# Patient Record
Sex: FEMALE | Race: WHITE | NOT HISPANIC OR LATINO | URBAN - METROPOLITAN AREA
[De-identification: names, ages, dates, MRNs, and addresses within clinical notes are randomized per-mention and may not be internally consistent; named-entity substitution may affect disease eponyms.]

---

## 2017-03-15 ENCOUNTER — OFFICE (AMBULATORY)
Dept: URBAN - METROPOLITAN AREA CLINIC 64 | Facility: CLINIC | Age: 57
End: 2017-03-15

## 2017-03-15 VITALS
WEIGHT: 170 LBS | HEART RATE: 62 BPM | SYSTOLIC BLOOD PRESSURE: 129 MMHG | DIASTOLIC BLOOD PRESSURE: 89 MMHG | HEIGHT: 65 IN

## 2017-03-15 DIAGNOSIS — R10.84 GENERALIZED ABDOMINAL PAIN: ICD-10-CM

## 2017-03-15 DIAGNOSIS — K59.1 FUNCTIONAL DIARRHEA: ICD-10-CM

## 2017-03-15 DIAGNOSIS — Z12.11 ENCOUNTER FOR SCREENING FOR MALIGNANT NEOPLASM OF COLON: ICD-10-CM

## 2017-03-15 PROCEDURE — 99203 OFFICE O/P NEW LOW 30 MIN: CPT

## 2017-03-15 RX ORDER — METRONIDAZOLE 250 MG/1
750 TABLET, FILM COATED ORAL
Qty: 30 | Refills: 0 | Status: COMPLETED
Start: 2017-03-15 | End: 2017-04-13

## 2017-04-13 ENCOUNTER — OFFICE (AMBULATORY)
Dept: URBAN - METROPOLITAN AREA CLINIC 64 | Facility: CLINIC | Age: 57
End: 2017-04-13

## 2017-04-13 ENCOUNTER — ON CAMPUS - OUTPATIENT (AMBULATORY)
Dept: URBAN - METROPOLITAN AREA HOSPITAL 2 | Facility: HOSPITAL | Age: 57
End: 2017-04-13
Payer: COMMERCIAL

## 2017-04-13 VITALS
HEART RATE: 55 BPM | HEIGHT: 65 IN | OXYGEN SATURATION: 95 % | OXYGEN SATURATION: 98 % | OXYGEN SATURATION: 97 % | RESPIRATION RATE: 18 BRPM | SYSTOLIC BLOOD PRESSURE: 110 MMHG | WEIGHT: 168 LBS | HEART RATE: 66 BPM | OXYGEN SATURATION: 99 % | HEART RATE: 74 BPM | SYSTOLIC BLOOD PRESSURE: 88 MMHG | HEART RATE: 60 BPM | OXYGEN SATURATION: 96 % | RESPIRATION RATE: 20 BRPM | DIASTOLIC BLOOD PRESSURE: 67 MMHG | HEART RATE: 59 BPM | DIASTOLIC BLOOD PRESSURE: 53 MMHG | SYSTOLIC BLOOD PRESSURE: 117 MMHG | SYSTOLIC BLOOD PRESSURE: 124 MMHG | DIASTOLIC BLOOD PRESSURE: 79 MMHG | SYSTOLIC BLOOD PRESSURE: 148 MMHG | SYSTOLIC BLOOD PRESSURE: 98 MMHG | DIASTOLIC BLOOD PRESSURE: 56 MMHG | SYSTOLIC BLOOD PRESSURE: 120 MMHG | DIASTOLIC BLOOD PRESSURE: 48 MMHG | DIASTOLIC BLOOD PRESSURE: 92 MMHG | TEMPERATURE: 97.1 F | HEART RATE: 61 BPM | DIASTOLIC BLOOD PRESSURE: 59 MMHG | RESPIRATION RATE: 16 BRPM | OXYGEN SATURATION: 100 %

## 2017-04-13 DIAGNOSIS — Z12.11 ENCOUNTER FOR SCREENING FOR MALIGNANT NEOPLASM OF COLON: ICD-10-CM

## 2017-04-13 DIAGNOSIS — K62.1 RECTAL POLYP: ICD-10-CM

## 2017-04-13 LAB
GI HISTOLOGY: A. UNSPECIFIED: (no result)
GI HISTOLOGY: PDF REPORT: (no result)

## 2017-04-13 PROCEDURE — 45385 COLONOSCOPY W/LESION REMOVAL: CPT | Mod: 33

## 2017-04-13 PROCEDURE — 88305 TISSUE EXAM BY PATHOLOGIST: CPT

## 2017-04-13 RX ADMIN — PROPOFOL: 10 INJECTION, EMULSION INTRAVENOUS at 10:00

## 2018-04-26 ENCOUNTER — TRANSCRIBE ORDERS (OUTPATIENT)
Dept: ADMINISTRATIVE | Facility: HOSPITAL | Age: 58
End: 2018-04-26

## 2018-04-26 DIAGNOSIS — R07.9 CHEST PAIN, UNSPECIFIED TYPE: Primary | ICD-10-CM

## 2021-06-11 ENCOUNTER — APPOINTMENT (OUTPATIENT)
Dept: GENERAL RADIOLOGY | Facility: HOSPITAL | Age: 61
End: 2021-06-11

## 2021-06-11 ENCOUNTER — HOSPITAL ENCOUNTER (EMERGENCY)
Facility: HOSPITAL | Age: 61
Discharge: HOME | End: 2021-06-11
Admitting: EMERGENCY MEDICINE

## 2021-06-11 VITALS
TEMPERATURE: 98.6 F | SYSTOLIC BLOOD PRESSURE: 129 MMHG | BODY MASS INDEX: 25.83 KG/M2 | WEIGHT: 155 LBS | OXYGEN SATURATION: 99 % | DIASTOLIC BLOOD PRESSURE: 68 MMHG | HEIGHT: 65 IN | HEART RATE: 83 BPM | RESPIRATION RATE: 16 BRPM

## 2021-06-11 DIAGNOSIS — R22.41 MASS OF RIGHT FOOT: ICD-10-CM

## 2021-06-11 DIAGNOSIS — M79.671 RIGHT FOOT PAIN: Primary | ICD-10-CM

## 2021-06-11 LAB
ALBUMIN SERPL-MCNC: 4.5 G/DL (ref 3.5–5.2)
ALBUMIN/GLOB SERPL: 1.7 G/DL
ALP SERPL-CCNC: 100 U/L (ref 39–117)
ALT SERPL W P-5'-P-CCNC: 9 U/L (ref 1–33)
ANION GAP SERPL CALCULATED.3IONS-SCNC: 13 MMOL/L (ref 5–15)
APTT PPP: 28.5 SECONDS (ref 24–31)
AST SERPL-CCNC: 13 U/L (ref 1–32)
BASOPHILS # BLD AUTO: 0.1 10*3/MM3 (ref 0–0.2)
BASOPHILS NFR BLD AUTO: 0.7 % (ref 0–1.5)
BILIRUB SERPL-MCNC: 0.4 MG/DL (ref 0–1.2)
BUN SERPL-MCNC: 10 MG/DL (ref 8–23)
BUN/CREAT SERPL: 9.1 (ref 7–25)
CALCIUM SPEC-SCNC: 10 MG/DL (ref 8.6–10.5)
CHLORIDE SERPL-SCNC: 98 MMOL/L (ref 98–107)
CO2 SERPL-SCNC: 27 MMOL/L (ref 22–29)
CREAT SERPL-MCNC: 1.1 MG/DL (ref 0.57–1)
CRP SERPL-MCNC: 4.6 MG/DL (ref 0–0.5)
DEPRECATED RDW RBC AUTO: 40.3 FL (ref 37–54)
EOSINOPHIL # BLD AUTO: 0 10*3/MM3 (ref 0–0.4)
EOSINOPHIL NFR BLD AUTO: 0.3 % (ref 0.3–6.2)
ERYTHROCYTE [DISTWIDTH] IN BLOOD BY AUTOMATED COUNT: 13.1 % (ref 12.3–15.4)
ERYTHROCYTE [SEDIMENTATION RATE] IN BLOOD: 16 MM/HR (ref 0–30)
GFR SERPL CREATININE-BSD FRML MDRD: 51 ML/MIN/1.73
GLOBULIN UR ELPH-MCNC: 2.6 GM/DL
GLUCOSE SERPL-MCNC: 105 MG/DL (ref 65–99)
HCT VFR BLD AUTO: 39.1 % (ref 34–46.6)
HGB BLD-MCNC: 13.3 G/DL (ref 12–15.9)
HOLD SPECIMEN: NORMAL
INR PPP: 0.95 (ref 0.93–1.1)
LYMPHOCYTES # BLD AUTO: 0.5 10*3/MM3 (ref 0.7–3.1)
LYMPHOCYTES NFR BLD AUTO: 6.4 % (ref 19.6–45.3)
MCH RBC QN AUTO: 29.6 PG (ref 26.6–33)
MCHC RBC AUTO-ENTMCNC: 33.9 G/DL (ref 31.5–35.7)
MCV RBC AUTO: 87.3 FL (ref 79–97)
MONOCYTES # BLD AUTO: 0.6 10*3/MM3 (ref 0.1–0.9)
MONOCYTES NFR BLD AUTO: 7.2 % (ref 5–12)
NEUTROPHILS NFR BLD AUTO: 6.7 10*3/MM3 (ref 1.7–7)
NEUTROPHILS NFR BLD AUTO: 85.4 % (ref 42.7–76)
NRBC BLD AUTO-RTO: 0 /100 WBC (ref 0–0.2)
PLATELET # BLD AUTO: 311 10*3/MM3 (ref 140–450)
PMV BLD AUTO: 9 FL (ref 6–12)
POTASSIUM SERPL-SCNC: 4 MMOL/L (ref 3.5–5.2)
PROT SERPL-MCNC: 7.1 G/DL (ref 6–8.5)
PROTHROMBIN TIME: 10.5 SECONDS (ref 9.6–11.7)
RBC # BLD AUTO: 4.48 10*6/MM3 (ref 3.77–5.28)
SODIUM SERPL-SCNC: 138 MMOL/L (ref 136–145)
WBC # BLD AUTO: 7.9 10*3/MM3 (ref 3.4–10.8)

## 2021-06-11 PROCEDURE — 85652 RBC SED RATE AUTOMATED: CPT | Performed by: NURSE PRACTITIONER

## 2021-06-11 PROCEDURE — 73630 X-RAY EXAM OF FOOT: CPT

## 2021-06-11 PROCEDURE — 87040 BLOOD CULTURE FOR BACTERIA: CPT | Performed by: NURSE PRACTITIONER

## 2021-06-11 PROCEDURE — 96375 TX/PRO/DX INJ NEW DRUG ADDON: CPT

## 2021-06-11 PROCEDURE — 25010000002 MORPHINE PER 10 MG: Performed by: NURSE PRACTITIONER

## 2021-06-11 PROCEDURE — 86140 C-REACTIVE PROTEIN: CPT | Performed by: NURSE PRACTITIONER

## 2021-06-11 PROCEDURE — 85025 COMPLETE CBC W/AUTO DIFF WBC: CPT | Performed by: NURSE PRACTITIONER

## 2021-06-11 PROCEDURE — 85610 PROTHROMBIN TIME: CPT | Performed by: NURSE PRACTITIONER

## 2021-06-11 PROCEDURE — 80053 COMPREHEN METABOLIC PANEL: CPT | Performed by: NURSE PRACTITIONER

## 2021-06-11 PROCEDURE — 25010000002 ONDANSETRON PER 1 MG: Performed by: NURSE PRACTITIONER

## 2021-06-11 PROCEDURE — 96374 THER/PROPH/DIAG INJ IV PUSH: CPT

## 2021-06-11 PROCEDURE — 85730 THROMBOPLASTIN TIME PARTIAL: CPT | Performed by: NURSE PRACTITIONER

## 2021-06-11 PROCEDURE — 99283 EMERGENCY DEPT VISIT LOW MDM: CPT

## 2021-06-11 PROCEDURE — 25010000002 CEFTRIAXONE PER 250 MG: Performed by: NURSE PRACTITIONER

## 2021-06-11 RX ORDER — HYDROXYZINE PAMOATE 50 MG/1
50 CAPSULE ORAL 3 TIMES DAILY PRN
Qty: 12 CAPSULE | Refills: 0 | Status: SHIPPED | OUTPATIENT
Start: 2021-06-11 | End: 2021-06-11

## 2021-06-11 RX ORDER — SODIUM CHLORIDE 0.9 % (FLUSH) 0.9 %
10 SYRINGE (ML) INJECTION AS NEEDED
Status: DISCONTINUED | OUTPATIENT
Start: 2021-06-11 | End: 2021-06-11 | Stop reason: HOSPADM

## 2021-06-11 RX ORDER — MORPHINE SULFATE 4 MG/ML
4 INJECTION, SOLUTION INTRAMUSCULAR; INTRAVENOUS ONCE
Status: COMPLETED | OUTPATIENT
Start: 2021-06-11 | End: 2021-06-11

## 2021-06-11 RX ORDER — HYDROXYZINE PAMOATE 50 MG/1
50 CAPSULE ORAL 3 TIMES DAILY PRN
Qty: 12 CAPSULE | Refills: 0 | Status: SHIPPED | OUTPATIENT
Start: 2021-06-11 | End: 2022-05-05

## 2021-06-11 RX ORDER — ONDANSETRON 4 MG/1
4 TABLET, ORALLY DISINTEGRATING ORAL EVERY 8 HOURS PRN
Qty: 8 TABLET | Refills: 0 | Status: SHIPPED | OUTPATIENT
Start: 2021-06-11 | End: 2022-05-05

## 2021-06-11 RX ORDER — ONDANSETRON 4 MG/1
4 TABLET, ORALLY DISINTEGRATING ORAL EVERY 8 HOURS PRN
Qty: 8 TABLET | Refills: 0 | Status: SHIPPED | OUTPATIENT
Start: 2021-06-11 | End: 2021-06-11

## 2021-06-11 RX ORDER — ONDANSETRON 2 MG/ML
4 INJECTION INTRAMUSCULAR; INTRAVENOUS ONCE
Status: COMPLETED | OUTPATIENT
Start: 2021-06-11 | End: 2021-06-11

## 2021-06-11 RX ADMIN — WATER 1 G: 100 INJECTION, SOLUTION INTRAVENOUS at 20:29

## 2021-06-11 RX ADMIN — ONDANSETRON 4 MG: 2 INJECTION INTRAMUSCULAR; INTRAVENOUS at 18:11

## 2021-06-11 RX ADMIN — MORPHINE SULFATE 4 MG: 4 INJECTION INTRAVENOUS at 18:11

## 2021-06-11 NOTE — ED NOTES
Pt stated she was diagnosed with stage 1 melanoma two years ago on her right foot. Pt stated 6 months later after a surgery, she was diagnosed with stage 4 melanoma. Pt stated two days ago she went to see Dr Crain with [facility] outpatient surgery to schedule a surgery on her right foot. Pt was given Sulfamethoxazole and has been taking the meds as prescribed.    Pt stated this morning with anxiety, chills, nausea and pain in her right foot. Pt denies any n/d.     David Gibson, RN  06/11/21 2419

## 2021-06-12 NOTE — ED PROVIDER NOTES
Subjective   Patient is a 60-year-old white female with history of anxiety presents today with complaints of pain in her right foot.  She states recently diagnosed with melanoma with a fungating mass of the right foot.  She states she has had worsening symptoms over the last couple of months.  She states she saw surgeon at [facility] earlier this week and his tentative plans to have the mass excised with partial amputation of the foot.  She states she was started on antibiotics 2 days ago and has been taking those as prescribed.  She presents to the ER today with continued pain and requesting referrals to podiatrist and surgeons here in this area as she has transportation issues and difficulty getting to [place].  She denies any fever but states she has had some chills.  She denies any vomiting but states she has had some nausea.          Review of Systems   Constitutional: Positive for chills. Negative for fever.   Gastrointestinal: Positive for nausea. Negative for vomiting.   Musculoskeletal:        Right foot pain, mass to right foot   Psychiatric/Behavioral: The patient is nervous/anxious.        No past medical history on file.    No Known Allergies    No past surgical history on file.    No family history on file.    Social History     Socioeconomic History   • Marital status:      Spouse name: Not on file   • Number of children: Not on file   • Years of education: Not on file   • Highest education level: Not on file           Objective   Physical Exam  Vital signs and triage nurse note reviewed.  Constitutional: Awake, alert; well-developed and well-nourished. No acute distress is noted.  Appears anxious.  HEENT: Normocephalic, atraumatic; pupils are PERRL with intact EOM; oropharynx is pink and moist without exudate or erythema.  No drooling or pooling of oral secretions.  Neck: Supple, full range of motion without pain; no cervical lymphadenopathy. Normal phonation.  Cardiovascular: Regular rate and  rhythm, normal S1-S2.  No murmur noted.  Pulmonary: Respiratory effort regular nonlabored, breath sounds clear to auscultation all fields.  Abdomen: Soft, nontender, nondistended with normoactive bowel sounds; no rebound or guarding.  Musculoskeletal: Independent range of motion of all extremities.  There is a fungating oozing mass to the right foot located between the second and third toes.  It appears ulcerated and necrotic.  There is no surrounding erythema or edema to the rest of the foot.  No streaking.  Neuro: Alert oriented x3, speech is clear and appropriate, GCS 15.    Skin: Flesh tone, warm, dry    Procedures           ED Course      Labs Reviewed   COMPREHENSIVE METABOLIC PANEL - Abnormal; Notable for the following components:       Result Value    Glucose 105 (*)     Creatinine 1.10 (*)     eGFR Non  Amer 51 (*)     All other components within normal limits    Narrative:     GFR Normal >60  Chronic Kidney Disease <60  Kidney Failure <15     C-REACTIVE PROTEIN - Abnormal; Notable for the following components:    C-Reactive Protein 4.60 (*)     All other components within normal limits   CBC WITH AUTO DIFFERENTIAL - Abnormal; Notable for the following components:    Neutrophil % 85.4 (*)     Lymphocyte % 6.4 (*)     Lymphocytes, Absolute 0.50 (*)     All other components within normal limits   PROTIME-INR - Normal   APTT - Normal   SEDIMENTATION RATE - Normal   BLOOD CULTURE   BLOOD CULTURE   CBC AND DIFFERENTIAL    Narrative:     The following orders were created for panel order CBC & Differential.  Procedure                               Abnormality         Status                     ---------                               -----------         ------                     CBC Auto Differential[[id number]]        Abnormal            Final result                 Please view results for these tests on the individual orders.   EXTRA TUBES    Narrative:     The following orders were created for panel order  Extra Tubes.  Procedure                               Abnormality         Status                     ---------                               -----------         ------                     [facility][[id number]]                                   Final result                 Please view results for these tests on the individual orders.   [facility]     XR Foot 3+ View Right    Result Date: 6/11/2021  No acute osseous abnormality. Masses likely related to known malignancy as above. No destructive changes identified within the osseous structures.  Electronically Signed By-Diane Hooker MD On:6/11/2021 5:46 PM This report was finalized on [id number] by  Diane Hooker MD.    Medications   sodium chloride 0.9 % flush 10 mL (has no administration in time range)   Morphine sulfate (PF) injection 4 mg (4 mg Intravenous Given 6/11/21 1811)   ondansetron (ZOFRAN) injection 4 mg (4 mg Intravenous Given 6/11/21 1811)   cefTRIAXone (ROCEPHIN) in SWFI 1 gram/10ml IV PUSH syringe (1 g Intravenous Given 6/11/21 2029)                                          MDM  Number of Diagnoses or Management Options  Mass of right foot  Right foot pain  Diagnosis management comments: Patient had IV established.  She had labs and x-ray obtained.  She was given morphine for pain and Zofran for nausea.    Work-up: CBC and metabolic panel are grossly unremarkable.  Sed rate is within normal limits.  CRP mildly elevated at 4.6.  Blood cultures were obtained.  X-ray of the right foot shows No acute osseous abnormality. Masses likely related to known malignancy as above. No destructive changes identified within the osseous structures.    On reexamination, the patient is resting comfortably.  She is in no distress.  She still appears mildly anxious but has stable vital signs.  She is given a dose of IV Rocephin.  She was discussed with Dr. Luna, on-call podiatrist, who agrees to see patient as an outpatient in the office next week.    Patient  will be discharged home and instructed to continue her antibiotics and follow-up with Dr. Luna next week.  She is given warning signs for prompt return.  She voiced understanding.    Diagnosis and treatment plan discussed with patient.  Patient agreeable to plan.   I discussed findings with patient who voices understanding of discharge instructions, signs and symptoms requiring return to ED; discharged improved and in stable condition with follow up for re-evaluation.  Prescription for Zofran and hydroxyzine.       Amount and/or Complexity of Data Reviewed  Clinical lab tests: reviewed and ordered  Tests in the radiology section of CPT®: reviewed and ordered    Patient Progress  Patient progress: stable      Final diagnoses:   Right foot pain   Mass of right foot       ED Disposition  ED Disposition     ED Disposition Condition Comment    Discharge Stable           CHARLOTTE Luna DPM  [address] [place] [address]  [phone]    Schedule an appointment as soon as possible for a visit       Maximo Medina MD  [address] [facility] [place] [address]  [phone]    Schedule an appointment as soon as possible for a visit       Ray Osorio MD  [address] [facility] [place] [facility]  [phone]    Schedule an appointment as soon as possible for a visit       [facility] - [place] [address]  [phone]  Schedule an appointment as soon as possible for a visit            Medication List      New Prescriptions    hydrOXYzine pamoate 50 MG capsule  Commonly known as: VISTARIL  Take 1 capsule by mouth 3 (Three) Times a Day As Needed for Anxiety.     ondansetron ODT 4 MG disintegrating tablet  Commonly known as: Zofran ODT  Place 1 tablet on the tongue Every 8 (Eight) Hours As Needed for Nausea.           Where to Get Your Medications      These medications were sent to [facility]/pharmacy #[id number] - [facility] [place] - [address] [facility] - [phone] [place] [phone]   [address] [facility]  [place] [address]    Hours: 24-hours Phone: [phone]   · hydrOXYzine pamoate 50 MG capsule  · ondansetron ODT 4 MG disintegrating tablet          Kacy Pritchard, MARIN  06/11/21 8737

## 2021-06-12 NOTE — DISCHARGE INSTRUCTIONS
Continue antibiotics as previously prescribed.  Take new medication as prescribed.  Follow-up with podiatry.  Call them Monday for an appointment time.  Return for new or worsening symptoms.

## 2021-06-16 LAB
BACTERIA SPEC AEROBE CULT: NORMAL
BACTERIA SPEC AEROBE CULT: NORMAL

## 2022-05-05 ENCOUNTER — OFFICE VISIT (OUTPATIENT)
Dept: PODIATRY | Facility: CLINIC | Age: 62
End: 2022-05-05

## 2022-05-05 VITALS
HEART RATE: 97 BPM | HEIGHT: 65 IN | DIASTOLIC BLOOD PRESSURE: 88 MMHG | WEIGHT: 155 LBS | BODY MASS INDEX: 25.83 KG/M2 | SYSTOLIC BLOOD PRESSURE: 151 MMHG

## 2022-05-05 DIAGNOSIS — Z89.421 RIGHT TOE AMPUTEE: ICD-10-CM

## 2022-05-05 DIAGNOSIS — D48.5 NEOPLASM OF UNCERTAIN BEHAVIOR OF SKIN OF FOOT: ICD-10-CM

## 2022-05-05 DIAGNOSIS — M79.671 ACUTE FOOT PAIN, RIGHT: Primary | ICD-10-CM

## 2022-05-05 PROCEDURE — 99204 OFFICE O/P NEW MOD 45 MIN: CPT | Performed by: PODIATRIST

## 2022-05-05 RX ORDER — AMITRIPTYLINE HYDROCHLORIDE 25 MG/1
25 TABLET, FILM COATED ORAL
COMMUNITY
Start: 2022-04-11

## 2022-05-05 NOTE — H&P (VIEW-ONLY)
05/05/2022  Foot and Ankle Surgery - New Patient   Provider: Dr. Daniel Luna DPM  Location: [facility]    Subjective:  Martha Ruiz is a 61 y.o. female.     Chief Complaint   Patient presents with   • Right Foot - Toe Pain   • Initial Evaluation     Last pcp appt with abran Wyatt md  10/1/2022       HPI::     The patient presents to clinic today for right foot injury.    The patient states that she had a transmetatarsal amputation on her right foot 10 months ago in 06/28/2021. She communicates that she followed up with her surgeon and everything healed up fine, but the weird thing is that the bone curled up and is coming out. She states that it started 5 weeks ago. She reports that 2 days ago the bone came out of the skin. She reports that her pain level is between 2 and 3 out of 10. She adds that she dropped a package of cheese on it.    She communicates that she got the transmetatarsal amputations due to melanoma. She adds that 2021 was when she had her last surgery. She reports that got a spot on her foot from what she feels was due to a scratch that she got while outside with her dogs. She states that she cleaned it. She states that she has been going through this for 3 years and it the spot never spread. She denies any other medical issues. Se denies having diabetes. She denies any other skin lesions.     The patient communicates that she is not allergic to anything, but when she had her surgery she thinks that the anesthesia must have been too strong because it affected her stomach for about a month. She states that Dr. Amin performed her surgery. She reports that she thinks that he is a surgical oncologist.    She communicates that she researched to locate me.     No Known Allergies    History reviewed. No pertinent past medical history.    Past Surgical History:   Procedure Laterality Date   • TOE AMPUTATION Right        Family History   Problem Relation Age of Onset   • Cancer Mother    •  "Heart disease Father    • No Known Problems Maternal Grandmother    • No Known Problems Maternal Grandfather    • No Known Problems Paternal Grandmother    • No Known Problems Paternal Grandfather        Social History     Socioeconomic History   • Marital status:    Tobacco Use   • Smoking status: Former Smoker   • Smokeless tobacco: Never Used   Vaping Use   • Vaping Use: Never used   Substance and Sexual Activity   • Alcohol use: Never   • Drug use: Never   • Sexual activity: Defer        Current Outpatient Medications on File Prior to Visit   Medication Sig Dispense Refill   • amitriptyline (ELAVIL) 25 MG tablet Take 25 mg by mouth every night at bedtime.       No current facility-administered medications on file prior to visit.       Review of Systems:  General: Denies fever, chills, fatigue, and weakness.  Eyes: Denies vision loss, blurry vision, and excessive redness.  ENT: Denies hearing issues and difficulty swallowing.  Cardiovascular: Denies palpitations, chest pain, or syncopal episodes.  Respiratory: Denies shortness of breath, wheezing, and coughing.  GI: Denies abdominal pain, nausea, and vomiting.   : Denies frequency, hematuria, and urgency.  Musculoskeletal: Denies muscle cramps, joint pains, and stiffness.  Derm: + Right foot wound  Neuro: Denies headaches, numbness, loss of coordination, and tremors.  Psych: Denies anxiety and depression.  Endocrine: Denies temperature intolerance and changes in appetite.  Heme: Denies bleeding disorders or abnormal bruising.     Objective   /88   Pulse 97   Ht 165.1 cm (65\")   Wt 70.3 kg (155 lb)   BMI 25.79 kg/m²     Foot/Ankle Exam:       General:   Appearance: appears stated age and healthy    Orientation: AAOx3    Affect: appropriate      VASCULAR      Right Foot Vascularity   Normal vascular exam    Dorsalis pedis:  2+  Posterior tibial:  2+  Skin Temperature: warm    Edema Grading:  None  CFT:  < 3 seconds  Pedal Hair Growth:  " Present  Varicosities: none        NEUROLOGIC     Right Foot Neurologic   Light touch sensation:  Normal  Hot/Cold sensation: normal    Achilles reflex:  2+     MUSCULOSKELETAL      Right Foot Musculoskeletal    Amputation   Toes amputated: second toe and third toe  Ecchymosis:  None  Tenderness: 2nd MTPJ and 3rd MTPJ       MUSCLE STRENGTH     Right Foot Muscle Strength   Normal strength    Foot dorsiflexion:  5  Foot plantar flexion:  5  Foot inversion:  5  Foot eversion:  5     DERMATOLOGIC     Right Foot Dermatologic   Skin: ulcer    Skin: right foot skin not intact, no right foot cellulitis, no right foot drainage, no right foot redness and no right foot maceration        Right Foot Additional Comments Raised nodule involving the amputation site of the second and third digits with central ulceration.  No periwound erythema, maceration, or drainage.  Underlying mass-effect.  Nodule measures approximately 1 cm in diameter.  Mild periwound purplish discoloration      Assessment/Plan   Diagnoses and all orders for this visit:    1. Acute foot pain, right (Primary)  -     XR Foot 3+ View Right    2. Neoplasm of uncertain behavior of skin of foot  -     MRI Foot Right With & Without Contrast; Future  -     COVID PRE-OP / PRE-PROCEDURE SCREENING ORDER (NO ISOLATION) - Swab, Nasopharynx; Future  -     CBC (No Diff); Future  -     Basic Metabolic Panel; Future  -     ECG 12 Lead; Future  -     XR Chest 2 View; Future  -     Case Request; Standing  -     Case Request    3. Right toe amputee (HCC)    Other orders  -     Follow Anesthesia Guidelines / Standing Orders; Future  -     Obtain Informed Consent; Future  -     Provide NPO Instructions to Patient; Future  -     Chlorhexidine Skin Prep; Future      Patient is a 61-year-old female that presents with wound involving her right foot.  Patient states that the wound has been present over the last week.  She states that she dropped a pack of cheese on her foot and feels  that this caused the problem.  She states that she had an underlying soft tissue prominence to this area for several weeks prior to this incident.  She does have amputation of the second and third toes from previous melanoma resection last year.  Apparently surgery was performed by Dr. Amin.  She has not had any recent evaluation by her surgeon or oncologist.  Patient feels that the nodule and ulceration are due to heterotopic bone formation from the amputation.  Imaging was independently reviewed showing no bony proliferation or further concerns.  She does have mild erosive changes involving the second and third metatarsal heads.  I have explained that findings are concerning for recurrent melanoma.  I have suggested that we proceed with an MRI with contrast.  I also feel that the most appropriate option would be to proceed with excisional biopsy of the lesion.  She does understand that she may require additional surgery including major amputation if results are positive for melanoma.  We will also need to consider referral back to Dr. Amin/surgical oncology.  We will plan for the MRI in the near future with surgery following.  Greater than 45 minutes was spent before, during, and after evaluation for patient care.    Orders Placed This Encounter   Procedures   • COVID PRE-OP / PRE-PROCEDURE SCREENING ORDER (NO ISOLATION) - Swab, Nasopharynx     Standing Status:   Future     Standing Expiration Date:   5/6/2023     Order Specific Question:   Please select your location:     Answer:    [place]     Order Specific Question:   COVID Screening Order:     Answer:   In-House: EMERGENT/PREOP-CEPHEID, 3-4 HR TAT [[id number]]     Order Specific Question:   Previously tested for COVID-19?     Answer:   Unknown     Order Specific Question:   Employed in healthcare setting?     Answer:   No     Order Specific Question:   Symptomatic for COVID-19 as defined by CDC?     Answer:   No     Order Specific Question:    Hospitalized for COVID-19?     Answer:   No     Order Specific Question:   Admitted to ICU for COVID-19?     Answer:   No     Order Specific Question:   Resident in a congregate (group) care setting?     Answer:   No     Order Specific Question:   Pregnant?     Answer:   No     Order Specific Question:   Release to patient     Answer:   Immediate   • XR Foot 3+ View Right     Order Specific Question:   Reason for Exam:     Answer:   s/p right toe amputation x 2 6/28/2021   . drop injurt 2 days ago. open wound, r/o osteomyelitis   room 10 wb     Order Specific Question:   Does this patient have a diabetic monitoring/medication delivering device on?     Answer:   No     Order Specific Question:   Release to patient     Answer:   Immediate   • MRI Foot Right With & Without Contrast     Standing Status:   Future     Standing Expiration Date:   5/5/2023     Scheduling Instructions:      right foot mri with and without contrast      Dx: neoplasm of skin   • XR Chest 2 View     Standing Status:   Future     Standing Expiration Date:   5/6/2023     Order Specific Question:   Reason for Exam:     Answer:   Preop   • CBC (No Diff)     Standing Status:   Future     Standing Expiration Date:   5/6/2023     Order Specific Question:   Release to patient     Answer:   Immediate   • Basic Metabolic Panel     Standing Status:   Future     Standing Expiration Date:   5/6/2023     Order Specific Question:   Release to patient     Answer:   Immediate   • Follow Anesthesia Guidelines / Standing Orders     Standing Status:   Future   • Obtain Informed Consent     Standing Status:   Future     Order Specific Question:   Informed Consent Given For     Answer:   Excisional biopsy of unknown soft tissue lesion involving the right foot with attempted closure   • Provide NPO Instructions to Patient     Standing Status:   Future   • Chlorhexidine Skin Prep     Chlorhexidine Skin Prep and Instructions For All Patients Having A Procedure  Requiring an Outward Incision if Not Allergic. If Allergic, Give Antibacterial Skin Wipes and Instructions. Do Not Use For Facial Cases or on Any Mucus Membranes.     Standing Status:   Future   • ECG 12 Lead     Standing Status:   Future     Standing Expiration Date:   5/6/2023     Order Specific Question:   Reason for Exam:     Answer:   Preop          Note is dictated utilizing voice recognition software. Unfortunately this leads to occasional typographical errors. I apologize in advance if the situation occurs. If questions occur please do not hesitate to call our office.

## 2022-05-05 NOTE — PROGRESS NOTES
05/05/2022  Foot and Ankle Surgery - New Patient   Provider: Dr. Daniel Luna DPM  Location: [facility]    Subjective:  Martha Ruiz is a 61 y.o. female.     Chief Complaint   Patient presents with   • Right Foot - Toe Pain   • Initial Evaluation     Last pcp appt with abran Wyatt md  10/1/2022       HPI::     The patient presents to clinic today for right foot injury.    The patient states that she had a transmetatarsal amputation on her right foot 10 months ago in 06/28/2021. She communicates that she followed up with her surgeon and everything healed up fine, but the weird thing is that the bone curled up and is coming out. She states that it started 5 weeks ago. She reports that 2 days ago the bone came out of the skin. She reports that her pain level is between 2 and 3 out of 10. She adds that she dropped a package of cheese on it.    She communicates that she got the transmetatarsal amputations due to melanoma. She adds that 2021 was when she had her last surgery. She reports that got a spot on her foot from what she feels was due to a scratch that she got while outside with her dogs. She states that she cleaned it. She states that she has been going through this for 3 years and it the spot never spread. She denies any other medical issues. Se denies having diabetes. She denies any other skin lesions.     The patient communicates that she is not allergic to anything, but when she had her surgery she thinks that the anesthesia must have been too strong because it affected her stomach for about a month. She states that Dr. Amin performed her surgery. She reports that she thinks that he is a surgical oncologist.    She communicates that she researched to locate me.     No Known Allergies    History reviewed. No pertinent past medical history.    Past Surgical History:   Procedure Laterality Date   • TOE AMPUTATION Right        Family History   Problem Relation Age of Onset   • Cancer Mother    •  "Heart disease Father    • No Known Problems Maternal Grandmother    • No Known Problems Maternal Grandfather    • No Known Problems Paternal Grandmother    • No Known Problems Paternal Grandfather        Social History     Socioeconomic History   • Marital status:    Tobacco Use   • Smoking status: Former Smoker   • Smokeless tobacco: Never Used   Vaping Use   • Vaping Use: Never used   Substance and Sexual Activity   • Alcohol use: Never   • Drug use: Never   • Sexual activity: Defer        Current Outpatient Medications on File Prior to Visit   Medication Sig Dispense Refill   • amitriptyline (ELAVIL) 25 MG tablet Take 25 mg by mouth every night at bedtime.       No current facility-administered medications on file prior to visit.       Review of Systems:  General: Denies fever, chills, fatigue, and weakness.  Eyes: Denies vision loss, blurry vision, and excessive redness.  ENT: Denies hearing issues and difficulty swallowing.  Cardiovascular: Denies palpitations, chest pain, or syncopal episodes.  Respiratory: Denies shortness of breath, wheezing, and coughing.  GI: Denies abdominal pain, nausea, and vomiting.   : Denies frequency, hematuria, and urgency.  Musculoskeletal: Denies muscle cramps, joint pains, and stiffness.  Derm: + Right foot wound  Neuro: Denies headaches, numbness, loss of coordination, and tremors.  Psych: Denies anxiety and depression.  Endocrine: Denies temperature intolerance and changes in appetite.  Heme: Denies bleeding disorders or abnormal bruising.     Objective   /88   Pulse 97   Ht 165.1 cm (65\")   Wt 70.3 kg (155 lb)   BMI 25.79 kg/m²     Foot/Ankle Exam:       General:   Appearance: appears stated age and healthy    Orientation: AAOx3    Affect: appropriate      VASCULAR      Right Foot Vascularity   Normal vascular exam    Dorsalis pedis:  2+  Posterior tibial:  2+  Skin Temperature: warm    Edema Grading:  None  CFT:  < 3 seconds  Pedal Hair Growth:  " Present  Varicosities: none        NEUROLOGIC     Right Foot Neurologic   Light touch sensation:  Normal  Hot/Cold sensation: normal    Achilles reflex:  2+     MUSCULOSKELETAL      Right Foot Musculoskeletal    Amputation   Toes amputated: second toe and third toe  Ecchymosis:  None  Tenderness: 2nd MTPJ and 3rd MTPJ       MUSCLE STRENGTH     Right Foot Muscle Strength   Normal strength    Foot dorsiflexion:  5  Foot plantar flexion:  5  Foot inversion:  5  Foot eversion:  5     DERMATOLOGIC     Right Foot Dermatologic   Skin: ulcer    Skin: right foot skin not intact, no right foot cellulitis, no right foot drainage, no right foot redness and no right foot maceration        Right Foot Additional Comments Raised nodule involving the amputation site of the second and third digits with central ulceration.  No periwound erythema, maceration, or drainage.  Underlying mass-effect.  Nodule measures approximately 1 cm in diameter.  Mild periwound purplish discoloration      Assessment/Plan   Diagnoses and all orders for this visit:    1. Acute foot pain, right (Primary)  -     XR Foot 3+ View Right    2. Neoplasm of uncertain behavior of skin of foot  -     MRI Foot Right With & Without Contrast; Future  -     COVID PRE-OP / PRE-PROCEDURE SCREENING ORDER (NO ISOLATION) - Swab, Nasopharynx; Future  -     CBC (No Diff); Future  -     Basic Metabolic Panel; Future  -     ECG 12 Lead; Future  -     XR Chest 2 View; Future  -     Case Request; Standing  -     Case Request    3. Right toe amputee (HCC)    Other orders  -     Follow Anesthesia Guidelines / Standing Orders; Future  -     Obtain Informed Consent; Future  -     Provide NPO Instructions to Patient; Future  -     Chlorhexidine Skin Prep; Future      Patient is a 61-year-old female that presents with wound involving her right foot.  Patient states that the wound has been present over the last week.  She states that she dropped a pack of cheese on her foot and feels  that this caused the problem.  She states that she had an underlying soft tissue prominence to this area for several weeks prior to this incident.  She does have amputation of the second and third toes from previous melanoma resection last year.  Apparently surgery was performed by Dr. Amin.  She has not had any recent evaluation by her surgeon or oncologist.  Patient feels that the nodule and ulceration are due to heterotopic bone formation from the amputation.  Imaging was independently reviewed showing no bony proliferation or further concerns.  She does have mild erosive changes involving the second and third metatarsal heads.  I have explained that findings are concerning for recurrent melanoma.  I have suggested that we proceed with an MRI with contrast.  I also feel that the most appropriate option would be to proceed with excisional biopsy of the lesion.  She does understand that she may require additional surgery including major amputation if results are positive for melanoma.  We will also need to consider referral back to Dr. Amin/surgical oncology.  We will plan for the MRI in the near future with surgery following.  Greater than 45 minutes was spent before, during, and after evaluation for patient care.    Orders Placed This Encounter   Procedures   • COVID PRE-OP / PRE-PROCEDURE SCREENING ORDER (NO ISOLATION) - Swab, Nasopharynx     Standing Status:   Future     Standing Expiration Date:   5/6/2023     Order Specific Question:   Please select your location:     Answer:    [place]     Order Specific Question:   COVID Screening Order:     Answer:   In-House: EMERGENT/PREOP-CEPHEID, 3-4 HR TAT [[id number]]     Order Specific Question:   Previously tested for COVID-19?     Answer:   Unknown     Order Specific Question:   Employed in healthcare setting?     Answer:   No     Order Specific Question:   Symptomatic for COVID-19 as defined by CDC?     Answer:   No     Order Specific Question:    Hospitalized for COVID-19?     Answer:   No     Order Specific Question:   Admitted to ICU for COVID-19?     Answer:   No     Order Specific Question:   Resident in a congregate (group) care setting?     Answer:   No     Order Specific Question:   Pregnant?     Answer:   No     Order Specific Question:   Release to patient     Answer:   Immediate   • XR Foot 3+ View Right     Order Specific Question:   Reason for Exam:     Answer:   s/p right toe amputation x 2 6/28/2021   . drop injurt 2 days ago. open wound, r/o osteomyelitis   room 10 wb     Order Specific Question:   Does this patient have a diabetic monitoring/medication delivering device on?     Answer:   No     Order Specific Question:   Release to patient     Answer:   Immediate   • MRI Foot Right With & Without Contrast     Standing Status:   Future     Standing Expiration Date:   5/5/2023     Scheduling Instructions:      right foot mri with and without contrast      Dx: neoplasm of skin   • XR Chest 2 View     Standing Status:   Future     Standing Expiration Date:   5/6/2023     Order Specific Question:   Reason for Exam:     Answer:   Preop   • CBC (No Diff)     Standing Status:   Future     Standing Expiration Date:   5/6/2023     Order Specific Question:   Release to patient     Answer:   Immediate   • Basic Metabolic Panel     Standing Status:   Future     Standing Expiration Date:   5/6/2023     Order Specific Question:   Release to patient     Answer:   Immediate   • Follow Anesthesia Guidelines / Standing Orders     Standing Status:   Future   • Obtain Informed Consent     Standing Status:   Future     Order Specific Question:   Informed Consent Given For     Answer:   Excisional biopsy of unknown soft tissue lesion involving the right foot with attempted closure   • Provide NPO Instructions to Patient     Standing Status:   Future   • Chlorhexidine Skin Prep     Chlorhexidine Skin Prep and Instructions For All Patients Having A Procedure  Requiring an Outward Incision if Not Allergic. If Allergic, Give Antibacterial Skin Wipes and Instructions. Do Not Use For Facial Cases or on Any Mucus Membranes.     Standing Status:   Future   • ECG 12 Lead     Standing Status:   Future     Standing Expiration Date:   5/6/2023     Order Specific Question:   Reason for Exam:     Answer:   Preop          Note is dictated utilizing voice recognition software. Unfortunately this leads to occasional typographical errors. I apologize in advance if the situation occurs. If questions occur please do not hesitate to call our office.

## 2022-05-06 PROBLEM — D48.5 NEOPLASM OF UNCERTAIN BEHAVIOR OF SKIN OF FOOT: Status: ACTIVE | Noted: 2022-05-06

## 2022-05-06 RX ORDER — VITAMIN B COMPLEX
1 CAPSULE ORAL DAILY
COMMUNITY

## 2022-05-06 RX ORDER — MELATONIN
DAILY
COMMUNITY

## 2022-05-09 ENCOUNTER — HOSPITAL ENCOUNTER (OUTPATIENT)
Dept: CARDIOLOGY | Facility: HOSPITAL | Age: 62
Discharge: HOME | End: 2022-05-09

## 2022-05-09 ENCOUNTER — LAB (OUTPATIENT)
Dept: LAB | Facility: HOSPITAL | Age: 62
End: 2022-05-09

## 2022-05-09 ENCOUNTER — HOSPITAL ENCOUNTER (OUTPATIENT)
Dept: GENERAL RADIOLOGY | Facility: HOSPITAL | Age: 62
Discharge: HOME | End: 2022-05-09

## 2022-05-09 ENCOUNTER — HOSPITAL ENCOUNTER (OUTPATIENT)
Dept: MRI IMAGING | Facility: HOSPITAL | Age: 62
Discharge: HOME | End: 2022-05-09

## 2022-05-09 DIAGNOSIS — D48.5 NEOPLASM OF UNCERTAIN BEHAVIOR OF SKIN OF FOOT: ICD-10-CM

## 2022-05-09 LAB
ANION GAP SERPL CALCULATED.3IONS-SCNC: 10.7 MMOL/L (ref 5–15)
BUN SERPL-MCNC: 10 MG/DL (ref 8–23)
BUN/CREAT SERPL: 11 (ref 7–25)
CALCIUM SPEC-SCNC: 10.3 MG/DL (ref 8.6–10.5)
CHLORIDE SERPL-SCNC: 103 MMOL/L (ref 98–107)
CO2 SERPL-SCNC: 26.3 MMOL/L (ref 22–29)
CREAT BLDA-MCNC: 0.9 MG/DL (ref 0.6–1.3)
CREAT SERPL-MCNC: 0.91 MG/DL (ref 0.57–1)
DEPRECATED RDW RBC AUTO: 40.4 FL (ref 37–54)
EGFRCR SERPLBLD CKD-EPI 2021: 71.9 ML/MIN/1.73
EGFRCR SERPLBLD CKD-EPI 2021: 72.9 ML/MIN/1.73
ERYTHROCYTE [DISTWIDTH] IN BLOOD BY AUTOMATED COUNT: 12.5 % (ref 12.3–15.4)
GLUCOSE SERPL-MCNC: 91 MG/DL (ref 65–99)
HCT VFR BLD AUTO: 39.6 % (ref 34–46.6)
HGB BLD-MCNC: 13.5 G/DL (ref 12–15.9)
MCH RBC QN AUTO: 30.1 PG (ref 26.6–33)
MCHC RBC AUTO-ENTMCNC: 34.1 G/DL (ref 31.5–35.7)
MCV RBC AUTO: 88.2 FL (ref 79–97)
PLATELET # BLD AUTO: 291 10*3/MM3 (ref 140–450)
PMV BLD AUTO: 11.1 FL (ref 6–12)
POTASSIUM SERPL-SCNC: 3.9 MMOL/L (ref 3.5–5.2)
RBC # BLD AUTO: 4.49 10*6/MM3 (ref 3.77–5.28)
SODIUM SERPL-SCNC: 140 MMOL/L (ref 136–145)
WBC NRBC COR # BLD: 7.76 10*3/MM3 (ref 3.4–10.8)

## 2022-05-09 PROCEDURE — 73720 MRI LWR EXTREMITY W/O&W/DYE: CPT

## 2022-05-09 PROCEDURE — 93010 ELECTROCARDIOGRAM REPORT: CPT | Performed by: INTERNAL MEDICINE

## 2022-05-09 PROCEDURE — 93005 ELECTROCARDIOGRAM TRACING: CPT | Performed by: PODIATRIST

## 2022-05-09 PROCEDURE — 71046 X-RAY EXAM CHEST 2 VIEWS: CPT

## 2022-05-09 PROCEDURE — 80048 BASIC METABOLIC PNL TOTAL CA: CPT

## 2022-05-09 PROCEDURE — 25010000002 GADOTERIDOL PER 1 ML: Performed by: PODIATRIST

## 2022-05-09 PROCEDURE — 85027 COMPLETE CBC AUTOMATED: CPT

## 2022-05-09 PROCEDURE — 82565 ASSAY OF CREATININE: CPT

## 2022-05-09 PROCEDURE — A9579 GAD-BASE MR CONTRAST NOS,1ML: HCPCS | Performed by: PODIATRIST

## 2022-05-09 PROCEDURE — 36415 COLL VENOUS BLD VENIPUNCTURE: CPT

## 2022-05-09 RX ADMIN — GADOTERIDOL 13 ML: 279.3 INJECTION, SOLUTION INTRAVENOUS at 15:53

## 2022-05-10 ENCOUNTER — LAB (OUTPATIENT)
Dept: LAB | Facility: HOSPITAL | Age: 62
End: 2022-05-10

## 2022-05-10 DIAGNOSIS — Z01.818 PRE-OP TESTING: Primary | ICD-10-CM

## 2022-05-10 LAB
QT INTERVAL: 363 MS
SARS-COV-2 ORF1AB RESP QL NAA+PROBE: NOT DETECTED

## 2022-05-10 PROCEDURE — U0004 COV-19 TEST NON-CDC HGH THRU: HCPCS

## 2022-05-10 PROCEDURE — C9803 HOPD COVID-19 SPEC COLLECT: HCPCS

## 2022-05-11 ENCOUNTER — APPOINTMENT (OUTPATIENT)
Dept: LAB | Facility: HOSPITAL | Age: 62
End: 2022-05-11

## 2022-05-12 ENCOUNTER — ANESTHESIA EVENT (OUTPATIENT)
Dept: PERIOP | Facility: HOSPITAL | Age: 62
End: 2022-05-12

## 2022-05-13 ENCOUNTER — TELEPHONE (OUTPATIENT)
Dept: ORTHOPEDIC SURGERY | Facility: CLINIC | Age: 62
End: 2022-05-13

## 2022-05-13 ENCOUNTER — ANESTHESIA (OUTPATIENT)
Dept: PERIOP | Facility: HOSPITAL | Age: 62
End: 2022-05-13

## 2022-05-13 ENCOUNTER — HOSPITAL ENCOUNTER (OUTPATIENT)
Facility: HOSPITAL | Age: 62
Setting detail: HOSPITAL OUTPATIENT SURGERY
Discharge: HOME | End: 2022-05-13
Attending: PODIATRIST | Admitting: PODIATRIST

## 2022-05-13 VITALS
RESPIRATION RATE: 18 BRPM | TEMPERATURE: 96.9 F | DIASTOLIC BLOOD PRESSURE: 91 MMHG | BODY MASS INDEX: 25.72 KG/M2 | HEIGHT: 65 IN | WEIGHT: 154.4 LBS | OXYGEN SATURATION: 96 % | HEART RATE: 86 BPM | SYSTOLIC BLOOD PRESSURE: 168 MMHG

## 2022-05-13 DIAGNOSIS — D48.5 NEOPLASM OF UNCERTAIN BEHAVIOR OF SKIN OF FOOT: ICD-10-CM

## 2022-05-13 PROCEDURE — 25010000002 HYDRALAZINE PER 20 MG: Performed by: NURSE ANESTHETIST, CERTIFIED REGISTERED

## 2022-05-13 PROCEDURE — 25010000002 FENTANYL CITRATE (PF) 100 MCG/2ML SOLUTION: Performed by: NURSE ANESTHETIST, CERTIFIED REGISTERED

## 2022-05-13 PROCEDURE — 88305 TISSUE EXAM BY PATHOLOGIST: CPT | Performed by: PODIATRIST

## 2022-05-13 PROCEDURE — 25010000002 CEFAZOLIN PER 500 MG: Performed by: PODIATRIST

## 2022-05-13 PROCEDURE — 25010000002 DEXAMETHASONE PER 1 MG: Performed by: NURSE ANESTHETIST, CERTIFIED REGISTERED

## 2022-05-13 PROCEDURE — 25010000002 ONDANSETRON PER 1 MG: Performed by: NURSE ANESTHETIST, CERTIFIED REGISTERED

## 2022-05-13 PROCEDURE — 88341 IMHCHEM/IMCYTCHM EA ADD ANTB: CPT | Performed by: PODIATRIST

## 2022-05-13 PROCEDURE — 11104 PUNCH BX SKIN SINGLE LESION: CPT | Performed by: PODIATRIST

## 2022-05-13 PROCEDURE — 88307 TISSUE EXAM BY PATHOLOGIST: CPT | Performed by: PODIATRIST

## 2022-05-13 PROCEDURE — 88342 IMHCHEM/IMCYTCHM 1ST ANTB: CPT | Performed by: PODIATRIST

## 2022-05-13 PROCEDURE — 25010000002 PROPOFOL 10 MG/ML EMULSION: Performed by: NURSE ANESTHETIST, CERTIFIED REGISTERED

## 2022-05-13 PROCEDURE — 88311 DECALCIFY TISSUE: CPT | Performed by: PODIATRIST

## 2022-05-13 PROCEDURE — 25010000002 FENTANYL CITRATE (PF) 50 MCG/ML SOLUTION: Performed by: NURSE ANESTHETIST, CERTIFIED REGISTERED

## 2022-05-13 PROCEDURE — 28122 PARTIAL REMOVAL OF FOOT BONE: CPT | Performed by: PODIATRIST

## 2022-05-13 RX ORDER — GLYCOPYRROLATE 0.2 MG/ML
INJECTION INTRAMUSCULAR; INTRAVENOUS AS NEEDED
Status: DISCONTINUED | OUTPATIENT
Start: 2022-05-13 | End: 2022-05-13 | Stop reason: SURG

## 2022-05-13 RX ORDER — PROMETHAZINE HYDROCHLORIDE 25 MG/1
25 TABLET ORAL ONCE AS NEEDED
Status: DISCONTINUED | OUTPATIENT
Start: 2022-05-13 | End: 2022-05-13 | Stop reason: HOSPADM

## 2022-05-13 RX ORDER — MIDAZOLAM HYDROCHLORIDE 1 MG/ML
1 INJECTION INTRAMUSCULAR; INTRAVENOUS
Status: DISCONTINUED | OUTPATIENT
Start: 2022-05-13 | End: 2022-05-13 | Stop reason: HOSPADM

## 2022-05-13 RX ORDER — DIPHENHYDRAMINE HYDROCHLORIDE 50 MG/ML
12.5 INJECTION INTRAMUSCULAR; INTRAVENOUS
Status: DISCONTINUED | OUTPATIENT
Start: 2022-05-13 | End: 2022-05-13 | Stop reason: HOSPADM

## 2022-05-13 RX ORDER — FENTANYL CITRATE 50 UG/ML
50 INJECTION, SOLUTION INTRAMUSCULAR; INTRAVENOUS
Status: DISCONTINUED | OUTPATIENT
Start: 2022-05-13 | End: 2022-05-13 | Stop reason: HOSPADM

## 2022-05-13 RX ORDER — LIDOCAINE HYDROCHLORIDE 10 MG/ML
INJECTION, SOLUTION EPIDURAL; INFILTRATION; INTRACAUDAL; PERINEURAL AS NEEDED
Status: DISCONTINUED | OUTPATIENT
Start: 2022-05-13 | End: 2022-05-13 | Stop reason: SURG

## 2022-05-13 RX ORDER — HYDROCODONE BITARTRATE AND ACETAMINOPHEN 7.5; 325 MG/1; MG/1
1 TABLET ORAL ONCE AS NEEDED
Status: COMPLETED | OUTPATIENT
Start: 2022-05-13 | End: 2022-05-13

## 2022-05-13 RX ORDER — HYDRALAZINE HYDROCHLORIDE 20 MG/ML
5 INJECTION INTRAMUSCULAR; INTRAVENOUS
Status: DISCONTINUED | OUTPATIENT
Start: 2022-05-13 | End: 2022-05-13 | Stop reason: HOSPADM

## 2022-05-13 RX ORDER — ONDANSETRON 2 MG/ML
4 INJECTION INTRAMUSCULAR; INTRAVENOUS ONCE AS NEEDED
Status: DISCONTINUED | OUTPATIENT
Start: 2022-05-13 | End: 2022-05-13 | Stop reason: HOSPADM

## 2022-05-13 RX ORDER — DEXAMETHASONE SODIUM PHOSPHATE 4 MG/ML
INJECTION, SOLUTION INTRA-ARTICULAR; INTRALESIONAL; INTRAMUSCULAR; INTRAVENOUS; SOFT TISSUE AS NEEDED
Status: DISCONTINUED | OUTPATIENT
Start: 2022-05-13 | End: 2022-05-13 | Stop reason: SURG

## 2022-05-13 RX ORDER — ONDANSETRON 2 MG/ML
INJECTION INTRAMUSCULAR; INTRAVENOUS AS NEEDED
Status: DISCONTINUED | OUTPATIENT
Start: 2022-05-13 | End: 2022-05-13 | Stop reason: SURG

## 2022-05-13 RX ORDER — HYDROCODONE BITARTRATE AND ACETAMINOPHEN 7.5; 325 MG/1; MG/1
1 TABLET ORAL EVERY 6 HOURS PRN
Qty: 28 TABLET | Refills: 0 | Status: SHIPPED | OUTPATIENT
Start: 2022-05-13

## 2022-05-13 RX ORDER — FENTANYL CITRATE 50 UG/ML
INJECTION, SOLUTION INTRAMUSCULAR; INTRAVENOUS AS NEEDED
Status: DISCONTINUED | OUTPATIENT
Start: 2022-05-13 | End: 2022-05-13 | Stop reason: SURG

## 2022-05-13 RX ORDER — SODIUM CHLORIDE, SODIUM LACTATE, POTASSIUM CHLORIDE, CALCIUM CHLORIDE 600; 310; 30; 20 MG/100ML; MG/100ML; MG/100ML; MG/100ML
INJECTION, SOLUTION INTRAVENOUS CONTINUOUS PRN
Status: DISCONTINUED | OUTPATIENT
Start: 2022-05-13 | End: 2022-05-13 | Stop reason: SURG

## 2022-05-13 RX ORDER — PROMETHAZINE HYDROCHLORIDE 25 MG/1
25 SUPPOSITORY RECTAL ONCE AS NEEDED
Status: DISCONTINUED | OUTPATIENT
Start: 2022-05-13 | End: 2022-05-13 | Stop reason: HOSPADM

## 2022-05-13 RX ORDER — PROPOFOL 10 MG/ML
VIAL (ML) INTRAVENOUS AS NEEDED
Status: DISCONTINUED | OUTPATIENT
Start: 2022-05-13 | End: 2022-05-13 | Stop reason: SURG

## 2022-05-13 RX ORDER — FENTANYL CITRATE 50 UG/ML
100 INJECTION, SOLUTION INTRAMUSCULAR; INTRAVENOUS
Status: DISCONTINUED | OUTPATIENT
Start: 2022-05-13 | End: 2022-05-13 | Stop reason: HOSPADM

## 2022-05-13 RX ADMIN — FENTANYL CITRATE 50 MCG: 50 INJECTION, SOLUTION INTRAMUSCULAR; INTRAVENOUS at 10:19

## 2022-05-13 RX ADMIN — DEXAMETHASONE SODIUM PHOSPHATE 4 MG: 4 INJECTION, SOLUTION INTRAMUSCULAR; INTRAVENOUS at 08:54

## 2022-05-13 RX ADMIN — FENTANYL CITRATE 50 MCG: 50 INJECTION, SOLUTION INTRAMUSCULAR; INTRAVENOUS at 08:54

## 2022-05-13 RX ADMIN — GLYCOPYRROLATE 0.4 MCG: 0.2 INJECTION INTRAMUSCULAR; INTRAVENOUS at 09:27

## 2022-05-13 RX ADMIN — SODIUM CHLORIDE, SODIUM LACTATE, POTASSIUM CHLORIDE, AND CALCIUM CHLORIDE: .6; .31; .03; .02 INJECTION, SOLUTION INTRAVENOUS at 08:51

## 2022-05-13 RX ADMIN — HYDROCODONE BITARTRATE AND ACETAMINOPHEN 1 TABLET: 7.5; 325 TABLET ORAL at 10:19

## 2022-05-13 RX ADMIN — FENTANYL CITRATE 50 MCG: 50 INJECTION, SOLUTION INTRAMUSCULAR; INTRAVENOUS at 09:49

## 2022-05-13 RX ADMIN — ONDANSETRON 4 MG: 2 INJECTION INTRAMUSCULAR; INTRAVENOUS at 08:54

## 2022-05-13 RX ADMIN — FENTANYL CITRATE 25 MCG: 50 INJECTION, SOLUTION INTRAMUSCULAR; INTRAVENOUS at 09:17

## 2022-05-13 RX ADMIN — CEFAZOLIN 2 G: 2 INJECTION, POWDER, FOR SOLUTION INTRAMUSCULAR; INTRAVENOUS at 09:01

## 2022-05-13 RX ADMIN — HYDRALAZINE HYDROCHLORIDE 5 MG: 20 INJECTION INTRAMUSCULAR; INTRAVENOUS at 10:15

## 2022-05-13 RX ADMIN — FENTANYL CITRATE 25 MCG: 50 INJECTION, SOLUTION INTRAMUSCULAR; INTRAVENOUS at 09:21

## 2022-05-13 RX ADMIN — PROPOFOL 150 MG: 10 INJECTION, EMULSION INTRAVENOUS at 08:54

## 2022-05-13 RX ADMIN — LIDOCAINE HYDROCHLORIDE 30 MG: 10 INJECTION, SOLUTION EPIDURAL; INFILTRATION; INTRACAUDAL at 08:54

## 2022-05-13 NOTE — TELEPHONE ENCOUNTER
Caller: LILIAN   Relationship to Patient: SELF     Phone Number: [phone]  Reason for Call: PATIENT CALLING AFTER SX THIS MORNING 05/13/22, STATING DR SANTANA TOLD HER TO FOLLOW UP IN 1 WEEK, PATIENT CURRENTLY SCHEDULED FOR 2 WEEK POST OP, ATTEMPTED TO WARM TRANSFER

## 2022-05-13 NOTE — ANESTHESIA POSTPROCEDURE EVALUATION
Patient: Martha Ruiz    Procedure Summary     Date: 05/13/22 Room / Location: [facility] OR 07 / [facility] MAIN OR    Anesthesia Start: 0851 Anesthesia Stop: 0938    Procedure: BIOPSY SOFT TISSUE LOWER EXTEMITY (Right ) Diagnosis:       Neoplasm of uncertain behavior of skin of foot      (Neoplasm of uncertain behavior of skin of foot [D48.5])    Surgeons: CHARLOTTE Luna DPM Provider: Glynn Ren MD    Anesthesia Type: general ASA Status: 2          Anesthesia Type: general    Vitals  Vitals Value Taken Time   /90 05/13/22 1035   Temp 97 °F (36.1 °C) 05/13/22 1035   Pulse 88 05/13/22 1035   Resp 13 05/13/22 1035   SpO2 92 % 05/13/22 1035           Post Anesthesia Care and Evaluation    Patient location during evaluation: PACU  Patient participation: complete - patient participated  Level of consciousness: awake  Pain scale: See nurse's notes for pain score.  Pain management: adequate  Airway patency: patent  Anesthetic complications: No anesthetic complications  PONV Status: none  Cardiovascular status: acceptable  Respiratory status: acceptable  Hydration status: acceptable    Comments: Patient seen and examined postoperatively; vital signs stable; SpO2 greater than or equal to 90%; cardiopulmonary status stable; nausea/vomiting adequately controlled; pain adequately controlled; no apparent anesthesia complications; patient discharged from anesthesia care when discharge criteria were met

## 2022-05-13 NOTE — ANESTHESIA PROCEDURE NOTES
Airway  Urgency: elective    Date/Time: 5/13/2022 8:55 AM  Airway not difficult    General Information and Staff    Patient location during procedure: OR  Anesthesiologist: Glynn Ren MD  CRNA/CAA: Kera Palafox CRNA    Indications and Patient Condition  Indications for airway management: airway protection    Preoxygenated: yes  MILS maintained throughout  Mask difficulty assessment: 1 - vent by mask    Final Airway Details  Final airway type: supraglottic airway      Successful airway: classic  Size 3    Number of attempts at approach: 1  Assessment: lips, teeth, and gum same as pre-op and atraumatic intubation

## 2022-05-13 NOTE — OP NOTE
Operative Note   Foot and Ankle Surgery   Provider: Dr. Daniel Luna   Location: [facility]      Procedure:  1.  Radical resection of soft tissue tumor measuring 2.5 cm, right foot  2.  Partial excision of second metatarsal, right foot  3.  Partial excision of third metatarsal, right foot  4.  Skin biopsy, 4 mm punch, right lower leg    Pre-operative Diagnosis:   1.  Neoplasm of skin/subcutaneous tissue of uncertain behavior, right foot  2.  Neoplasm of skin of uncertain behavior, right lower leg    Post-operative Diagnosis: Same    Surgeon: Daniel Luna    Assistant: Gerry Babin PGY-2    Anesthesia: General    Implants: None    Findings: Large soft tissue tumor with local invasion involving the dorsal aspect of the second and third metatarsals extending into the third interspace region.  No obvious discoloration or proximally abnormal tissue.  Greater than 2 mm clean margin.    Specimen: Specimen to include skin, subcutaneous tissue, fascia, and bone measuring greater than 2.5 cm sent to pathology as permanent.  4 mm punch skin biopsy from right lower leg sent to pathology as permanent as well    Blood Loss: Less than 5cc    Complications: None    Post Op Plan: Discharge home.  Leave dressing clean, dry, intact.  Follow-up with me in 1 week for further planning    Summary:    Patient is a 61-year-old female that has been seen in office for issues involving her right foot.  Patient has recently noticed a wound involving the dorsal aspect of her foot after dropping a pack of cheese.  She states that she did have an underlying nodule that has been present for approximately 6 weeks.  Patient does relate history of melanoma involving her right foot that required amputation of the second and third toes last year by Dr. Amin at [facility].  MRI was performed concerning for recurrent melanoma.  I have discussed the findings with the patient at length and recommended that we proceed with excisional biopsy  "which will likely require excision of the second and third metatarsals.  During preop evaluation, patient has \" rash\" involving her pretibial region of the right lower extremity.  She states that this was recently noticed after being in the woods with her dogs.  Findings are highly consistent with metastasis.  I have recommended that we proceed with skin biopsy of this region as well.  Patient understands that she will have a large open wound involving the right foot.  Given the findings, I am concerned that she may require major amputation if findings are conclusive for recurrent melanoma.    Procedure, risks, complications, and goals were discussed with the patient at bedside.  Risks include but are not limited to infection, complications from anesthesia (including death), chronic pain or numbness, hematoma/seroma, deep vein thrombosis, wound complications, and potential for additional surgical procedures.  Patient understands and elects to proceed with surgery at this time. Informed consent was obtained before proceeding to the operating suite.  All questions were answered to the patient's satisfaction. No guarantees or assurances were given or implied.    Procedure:    Patient was brought to the operating room placed on operative table in supine position.  Once adequate general anesthesia was administered, a pneumatic tourniquet was placed about the patient's right thigh.  The right lower extremity scrubbed prepped and draped in usual sterile fashion.  The limb was elevated and exsanguinated and pneumatic tourniquet was inflated to 250 mmHg.  A formal timeout was conducted prior skin incision.    Attention was then directed to the pretibial region.  A 4 mm punch biopsy was used to isolate a small hyperpigmented lesion.  Full-thickness biopsy was performed including underlying subcutaneous tissue.  The specimen was sent to pathology as permanent.  The wound was irrigated and a 3-0 nylon suture was placed across " the biopsy site.    Attention was then directed to the foot.  The lesion measured approximately 2.0cm in diameter.  Wide excision was performed with full-thickness incision to the level of bone.  Meticulous dissection was performed ensuring to capture as much of the tumor and abnormal appearing tissue as possible.  The lesion did not appear to be well-circumscribed and appeared to have local invasion to the third interspace.  Resection continued deep to include the second and third metatarsals as part of the specimen.  A sagittal saw was then used to resect the second and third metatarsals at the mid diaphyseal region.  The specimen was collected in its entirety and sent on the back table to be sent to pathology as permanent.  Moderate fibrotic tissue noted to the plantar plate region of both the second and third metatarsophalangeal joints.  No significant discoloration or further concerns.  The wound was irrigated with copious amounts of normal saline.  A saline wet-to-dry dressing was placed.  The tourniquet was released and a prompt hyperemic response was noted to the remaining digits of the right lower extremity.  Patient tolerated the procedure and anesthesia well.  She was transferred from the operating room to the recovery room with vital signs stable and neuro vas status unchanged to the right lower extremity.      Dr. Daniel Luna, DPM  [facility]  [phone]    Note is dictated utilizing voice recognition software. Unfortunately this leads to occasional typographical errors. I apologize in advance if the situation occurs. If questions occur please do not hesitate to call our office.

## 2022-05-13 NOTE — TELEPHONE ENCOUNTER
PATIENT CALLED BACK IN STATING THAT DR SANTANA TOLD HER BEFORE SURGERY THIS MORNING TO CALL US AT 8 AM IN ORDER TO SCHEDULE A 1 WEEK POST OP VISIT INSTEAD OF 2. PATIENT FEELS SHE SHOULD NOT WAIT 2 WEEKS. 2 WEEK POST OP APPOINTMENT IS ALREADY MADE. DO WE NEED TO MOVE HER APPOINTMENT UP A WEEK. CB: [phone], PLEASE ADVISE.

## 2022-05-13 NOTE — ANESTHESIA PREPROCEDURE EVALUATION
Anesthesia Evaluation     Patient summary reviewed and Nursing notes reviewed   history of anesthetic complications: PONV  NPO Solid Status: > 8 hours  NPO Liquid Status: > 8 hours           Airway   Mallampati: I  TM distance: >3 FB  Neck ROM: full  No difficulty expected  Dental - normal exam     Pulmonary - normal exam   (+) a smoker Former,   Cardiovascular - negative cardio ROS and normal exam        Neuro/Psych- negative ROS  GI/Hepatic/Renal/Endo - negative ROS     Musculoskeletal (-) negative ROS    Abdominal  - normal exam    Bowel sounds: normal.   Substance History - negative use     OB/GYN negative ob/gyn ROS         Other                        Anesthesia Plan    ASA 2     general     intravenous induction     Anesthetic plan, all risks, benefits, and alternatives have been provided, discussed and informed consent has been obtained with: patient.    Plan discussed with CRNA and CAA.        CODE STATUS:

## 2022-05-13 NOTE — TELEPHONE ENCOUNTER
TOOK CALL FROM [facility], ADVISED CALLER THAT [facility]CIAN/SURGERY SCHEDULER MADE APPOINTMENT FOR FIRST POST OP. CALLER WAS GOING TO TRANSFER PATIENT TO ME, BUT PATIENT HUNG UP.

## 2022-05-19 ENCOUNTER — OFFICE VISIT (OUTPATIENT)
Dept: PODIATRY | Facility: CLINIC | Age: 62
End: 2022-05-19

## 2022-05-19 VITALS
SYSTOLIC BLOOD PRESSURE: 156 MMHG | HEIGHT: 65 IN | DIASTOLIC BLOOD PRESSURE: 85 MMHG | HEART RATE: 84 BPM | BODY MASS INDEX: 25.66 KG/M2 | WEIGHT: 154 LBS

## 2022-05-19 DIAGNOSIS — C43.71 MALIGNANT MELANOMA OF RIGHT LOWER EXTREMITY: ICD-10-CM

## 2022-05-19 DIAGNOSIS — C43.71 MALIGNANT MELANOMA OF RIGHT FOOT: ICD-10-CM

## 2022-05-19 DIAGNOSIS — Z89.421 RIGHT TOE AMPUTEE: ICD-10-CM

## 2022-05-19 DIAGNOSIS — M79.671 ACUTE FOOT PAIN, RIGHT: Primary | ICD-10-CM

## 2022-05-19 PROCEDURE — 99024 POSTOP FOLLOW-UP VISIT: CPT | Performed by: PODIATRIST
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
## 2022-05-20 ENCOUNTER — TELEPHONE (OUTPATIENT)
Dept: PODIATRY | Facility: CLINIC | Age: 62
End: 2022-05-20

## 2022-05-20 NOTE — TELEPHONE ENCOUNTER
I phoned Dr. Armando Amin office at [phone]. After on hold for 8 minutes the lady who answered told me she does not have access to see if a patient has appt. She sent me to appt line. I asked to be sent to dr. Brennan barraza. I got voicemail for Gisela and asked her to call me back. Patient needs appt ASAP.

## 2022-05-20 NOTE — TELEPHONE ENCOUNTER
TALAT CALLED BACK IN AND STATED YOU WILL HAVE TO FAX MR -[id number] AND CALL THE APPOINTMENT LINE -[phone] ONCE DONE. PLEASE ADVISE.

## 2022-05-20 NOTE — TELEPHONE ENCOUNTER
I SPOKE WITH PATIENT AND SHE IS GOING TO CALL  OFFICE AS WELL AND SEE IF SHE CAN MAKE APPT. I WILL KEEP TRYING AND WE WILL TOUCH BASE AGAIN MONDAY

## 2022-05-20 NOTE — TELEPHONE ENCOUNTER
DR SANTANA: PATIENT REQUEST 05/20/2022 PATIENT SEEN 05 19 22 AND DR SANTANA WANTED HER TO SEE DR REDDY, AT [facility]- HOWEVER, PATIENT REQUESTS TO BE REFERRED TO ONCOLOGIST W/I  - DOESN'T WANT TO GO TO [facility] (THE BEST ONE DR SANTANA WOULD ADVISE) // THE FAX# THE REFERRAL TO BE FAXED,  [phone]. PATIENT REQUESTS A CALL BACK ONCE REFERRAL IS IN. WANTS TO GET THIS STARTED AS SOON, AS POSSIBLE. PATIENT WAS ADVISED SOMEONE WOULD CALL HER BACK.

## 2022-05-23 NOTE — TELEPHONE ENCOUNTER
Spoke to Nuzhat with Dr. German's office. She was able to get patient an appointment tomorrow at 130 with Dr. German. I told her to make the appointment and I will try to get a hold of patient. I called and left a detailed message on patients voicemail asking her to callback so that we can give her appointment information.

## 2022-05-23 NOTE — TELEPHONE ENCOUNTER
"ADDENDUM:  PATIENT CALLED TODAY STATING SHE HAS DECIDED NOT TO HAVE SURGERY. SHE HAS HAD MULTIPLE SURGERIES AND \"JUST DON'T WANT TO BE CUT ON AGAIN\". PATIENT SAYS SHE HAS TALKED TO A DR. CARBONE, AND WANTS TO TRY HOLISTIC, NEUROPATHIC MEDICINE.  PATIENT WANTS TO F/U WITH DR. SANTANA TO CHECK ON HOW HER FOOT IS HEALING. ASKING IF SHE NEEDS TO COME EVERY 2 WEEKS?  "

## 2022-05-23 NOTE — TELEPHONE ENCOUNTER
Records faxed and contacted Dr Amin office, s/w appt desk. Attempted to make appt however was advised that they do not accept the patient's insurance, [employer] [place] [employer].     Where would you like the patient referred to? Please advise.

## 2022-05-24 ENCOUNTER — RESEARCH ENCOUNTER (OUTPATIENT)
Dept: ONCOLOGY | Facility: CLINIC | Age: 62
End: 2022-05-24

## 2022-05-24 NOTE — RESEARCH
Chart reviewed to determine eligibility for research trials. No current trials available for diagnosis.

## 2022-06-02 ENCOUNTER — OFFICE VISIT (OUTPATIENT)
Dept: PODIATRY | Facility: CLINIC | Age: 62
End: 2022-06-02

## 2022-06-02 VITALS
SYSTOLIC BLOOD PRESSURE: 150 MMHG | RESPIRATION RATE: 18 BRPM | WEIGHT: 145 LBS | HEIGHT: 65 IN | DIASTOLIC BLOOD PRESSURE: 96 MMHG | HEART RATE: 108 BPM | BODY MASS INDEX: 24.16 KG/M2 | OXYGEN SATURATION: 99 %

## 2022-06-02 DIAGNOSIS — C43.71 MALIGNANT MELANOMA OF RIGHT LOWER EXTREMITY: ICD-10-CM

## 2022-06-02 DIAGNOSIS — C43.71 MALIGNANT MELANOMA OF RIGHT FOOT: ICD-10-CM

## 2022-06-02 DIAGNOSIS — M79.671 ACUTE FOOT PAIN, RIGHT: Primary | ICD-10-CM

## 2022-06-02 DIAGNOSIS — L97.512 CHRONIC ULCER OF RIGHT FOOT WITH FAT LAYER EXPOSED: ICD-10-CM

## 2022-06-02 PROCEDURE — 99024 POSTOP FOLLOW-UP VISIT: CPT | Performed by: PODIATRIST

## 2022-06-02 NOTE — PROGRESS NOTES
"06/02/2022  Foot and Ankle Surgery - Established Patient/Follow-up  Provider: Dr. Daniel Luna DPM  Location: [facility]    Subjective:  Martha Ruiz is a 61 y.o. female.     Chief Complaint   Patient presents with   • Right Foot - Follow-up       HPI:     Patient returns to clinic today approximately 3 weeks after right foot surgery.     The patient reports that she called and left a message about her no show appointment with Dr. German. She states that she has been seeing naturopathic doctor. She states that she has been \"babying\" her right foot. She states that she does not want \"to be cut on anymore\". She has not been changing the bandages on her right foot wound or cleaning it because she was not informed.  Patient states multiple times that she plans on continue seeing her holistic physician versus proceeding with any appointment with oncology or surgical oncology.      No Known Allergies    Current Outpatient Medications on File Prior to Visit   Medication Sig Dispense Refill   • amitriptyline (ELAVIL) 25 MG tablet Take 25 mg by mouth every night at bedtime.     • B Complex Vitamins (vitamin b complex) capsule capsule Take 1 capsule by mouth Daily.     • cholecalciferol (VITAMIN D3) 25 MCG (1000 UT) tablet Take  by mouth Daily.     • HYDROcodone-acetaminophen (NORCO) 7.5-325 MG per tablet Take 1 tablet by mouth Every 6 (Six) Hours As Needed for Moderate Pain  (Pain). 28 tablet 0     No current facility-administered medications on file prior to visit.       Objective   /96   Pulse 108   Resp 18   Ht 165.1 cm (65\")   Wt 65.8 kg (145 lb)   SpO2 99%   BMI 24.13 kg/m²     Foot/Ankle Exam:       General:   Appearance: appears stated age and healthy    Orientation: AAOx3    Affect: appropriate      VASCULAR      Right Foot Vascularity   Normal vascular exam    Dorsalis pedis:  2+  Posterior tibial:  2+  Skin Temperature: warm    Edema Grading:  None  CFT:  < 3 seconds  Pedal Hair Growth:  " Present  Varicosities: none        NEUROLOGIC     Right Foot Neurologic   Light touch sensation:  Normal  Hot/Cold sensation: normal    Achilles reflex:  2+     MUSCULOSKELETAL      Right Foot Musculoskeletal    Amputation   Toes amputated: second toe and third toe  Ecchymosis:  None  Tenderness: 2nd MTPJ and 3rd MTPJ       MUSCLE STRENGTH     Right Foot Muscle Strength   Normal strength    Foot dorsiflexion:  5  Foot plantar flexion:  5  Foot inversion:  5  Foot eversion:  5     DERMATOLOGIC     Right Foot Dermatologic   Skin: ulcer    Skin comment:   The wound has filled in with granular tissue. No signs of infection noted. Wound measures approximately 2 cm in diameter.       Right Foot Additional Comments Multiple pretibial hyperpigmented lesions involving the right lower extremity.  Large open wound to the dorsal aspect of the foot secondary to excisional biopsy.  Mild bleeding and subtle malodor.  No active signs of infection          Assessment & Plan   Diagnoses and all orders for this visit:    1. Acute foot pain, right (Primary)    2. Chronic ulcer of right foot with fat layer exposed (HCC)    3. Malignant melanoma of right foot (HCC)    4. Malignant melanoma of right lower extremity (HCC)      Patient returns approximately 3 weeks after surgery and has improved the right foot wound.  The wound has filled in with granular tissue.  Hyperpigmentation noted to the wound periphery which is concerning given diagnosis of melanoma.  Patient has been seeing a holistic provider for continued care.  She did no-show her appointment with oncology.  She states that she does not want to proceed with any type of immunotherapy or surgery. Patient also understands my formal recommendation to continue care with Dr. Crain and oncology.   I did explain the severity of the situation with the patient at length.  She states that she fully understands and acknowledges that she is at high risk of death and further metastasis.   Patient has opted to continue seeing me for the wound.  The wound does appear at least stable without any signs of infection.  I have recommended that we start collagen dressing changes.  I did review proper dressing care with patient and .  She is to proceed on a daily basis and follow-up with me in 4 weeks for reevaluation.  Order has been sent in to [facility] for collagen and dressing change supplies.  I have asked that she call with any further questions or concerns.    No orders of the defined types were placed in this encounter.         Note is dictated utilizing voice recognition software. Unfortunately this leads to occasional typographical errors. I apologize in advance if the situation occurs. If questions occur please do not hesitate to call our office.    Transcribed from ambient dictation for CHARLOTTE Luna DPM by Ginny Galvez.  .  06/02/22   17:16 EDT    Patient verbalized consent to the visit recording.

## 2022-06-06 ENCOUNTER — TELEPHONE (OUTPATIENT)
Dept: ORTHOPEDIC SURGERY | Facility: CLINIC | Age: 62
End: 2022-06-06

## 2022-06-06 NOTE — TELEPHONE ENCOUNTER
PATIENT HAD AN ORDER SENT FOR COLLAGEN DRESSING. HER INSURANCE CALLED AND STATED THAT THE ORDER SENT IS NOT IN NETWORK WITH HER INSURANCE. THEY ARE REQUESTING IT TO BE SENT TO:  [facility]  P:[phone]  F:[phone]

## 2022-06-08 ENCOUNTER — TELEPHONE (OUTPATIENT)
Dept: PODIATRY | Facility: CLINIC | Age: 62
End: 2022-06-08

## 2022-06-08 NOTE — TELEPHONE ENCOUNTER
Caller: Martha Ruiz    Relationship to patient: Self    Best call back number:      Patient is needing: PATIENT SAYS JEREMIE BENITEZ STATED THEY DONT HAVE WHAT SHE NEEDS.  PLEASE CONTACT PATIENT WHEN THIS IS TAKEN CARE OF FOR HER TO

## 2022-06-08 NOTE — TELEPHONE ENCOUNTER
I spoke with [facility]. They state it takes at least 48 hours for them to see orderin system. they more than likely have the order but cant tell me more until it is in system. I spoke with patient to let her know I will send to [facility] due to I know they will get her supplies right away. She was instructed to call me with any problems. I provided her with the phone # to [facility] as well. Patient voices understanding.

## 2022-06-16 ENCOUNTER — TELEPHONE (OUTPATIENT)
Dept: PODIATRY | Facility: CLINIC | Age: 62
End: 2022-06-16

## 2022-06-16 NOTE — TELEPHONE ENCOUNTER
I spoke with patient and gave her instructions to wet the wound bed before applying collagen. Cover the collagen with 45 x 4 or abd pad and wrap with kerlix. She needs to clean the collagen out of wound bed before applying new. She voices understanding

## 2022-06-16 NOTE — TELEPHONE ENCOUNTER
Provider: DR. TRACY SANTANA  Caller:  LILIAN CRUZ  Relationship to Patient: SELF  Pharmacy:   Phone Number: [phone]  Reason for Call:  PATIENT SAYS DR. SANTANA  ORDERED SOME WOUND CARE DRESSINGS FOR HER RIGHT FOOT. SHE IS ASKING FOR INSTRUCTIONS ON HOW TO USE THEM.  When was the patient last seen:  6/2/22

## 2022-07-26 LAB
LAB AP CASE REPORT: NORMAL
LAB AP DIAGNOSIS COMMENT: NORMAL
PATH REPORT.ADDENDUM SPEC: NORMAL
PATH REPORT.FINAL DX SPEC: NORMAL
PATH REPORT.GROSS SPEC: NORMAL

## 2023-03-15 ENCOUNTER — TELEPHONE (OUTPATIENT)
Dept: PODIATRY | Facility: CLINIC | Age: 63
End: 2023-03-15

## 2023-03-15 NOTE — TELEPHONE ENCOUNTER
Caller:LILIAN CRUZ     Relationship to patient: SELF    Best call back number: [phone]    Patient is needing: PATIENT STATES JUST PICKED UP INSERTS FOR HER SHOES, HOWEVER IS NOT HELPING WITH HEEL PAIN.  JUST SCHEDULED APPT  WITH [facility] FOR 04/11/2023 FOR NEW PROBLEM- PATIENT REQ TO SPEAK WITH CLINICAL IN REGARDS TO WHAT SHE CAN DO FOR HER FOOT PAIN UNTIL HER APPT?    PATIENT STATES SHE IS NOT USING [facility]- SHE IS USING [facility] IN [place], [place].

## 2023-04-12 ENCOUNTER — OFFICE VISIT (OUTPATIENT)
Dept: PODIATRY | Facility: CLINIC | Age: 63
End: 2023-04-12
Payer: MEDICAID

## 2023-04-12 VITALS
HEART RATE: 84 BPM | WEIGHT: 145 LBS | BODY MASS INDEX: 24.16 KG/M2 | RESPIRATION RATE: 20 BRPM | HEIGHT: 65 IN | OXYGEN SATURATION: 100 %

## 2023-04-12 DIAGNOSIS — G89.29 CHRONIC HEEL PAIN, LEFT: Primary | ICD-10-CM

## 2023-04-12 DIAGNOSIS — M72.2 PLANTAR FASCIITIS, LEFT: ICD-10-CM

## 2023-04-12 DIAGNOSIS — Z89.421 RIGHT TOE AMPUTEE: ICD-10-CM

## 2023-04-12 DIAGNOSIS — C43.71 MALIGNANT MELANOMA OF RIGHT FOOT: ICD-10-CM

## 2023-04-12 DIAGNOSIS — M79.672 CHRONIC HEEL PAIN, LEFT: Primary | ICD-10-CM

## 2023-04-12 DIAGNOSIS — C43.71 MALIGNANT MELANOMA OF RIGHT LOWER EXTREMITY: ICD-10-CM

## 2023-04-12 NOTE — PROGRESS NOTES
"04/12/2023  Foot and Ankle Surgery - Established Patient/Follow-up  Provider: Dr. Daniel Luna DPM  Location: [facility]    Subjective:  Martha Ruiz is a 62 y.o. female.     Chief Complaint   Patient presents with   • Left Foot - Foot Pain   • Right Foot - Wound Check   • Follow-up     ADELIA Wyatt md 2022, date unknown       The patient is a 62-year-old female who presents to the clinic for a new problem involving her left heel. She is accompanied by an adult female.    The patient was last seen in 06/2022. She reports she was bedridden for a period of time, and she \"overdid it\" when she began ambulating. She localizes her pain to her left heel as well as the lateral aspect, and rates her pain as a 7 out of 10. The patient states she has been performing stretching exercises at home which she obtained from [employer]. She adds that she was wearing the [employer] inserts in her shoes; however, her pain worsened.    Additionally, the patient reports a lesion on her right foot that has been present for approximately 3 weeks, remaining the same size over that period of time.     The patient states she is in remission involving the malignant melanoma to her right lower extremity and follows with Dr. Morales, oncologist.     She reports she underwent a thyroidectomy 2 weeks ago.    No Known Allergies    Current Outpatient Medications on File Prior to Visit   Medication Sig Dispense Refill   • amitriptyline (ELAVIL) 25 MG tablet Take 1 tablet by mouth every night at bedtime.     • B Complex Vitamins (vitamin b complex) capsule capsule Take 1 capsule by mouth Daily.     • cholecalciferol (VITAMIN D3) 25 MCG (1000 UT) tablet Take  by mouth Daily.     • HYDROcodone-acetaminophen (NORCO) 7.5-325 MG per tablet Take 1 tablet by mouth Every 6 (Six) Hours As Needed for Moderate Pain  (Pain). 28 tablet 0     No current facility-administered medications on file prior to visit.       Objective   Pulse 84   Resp 20   Ht 165.1 " "cm (65\")   Wt 65.8 kg (145 lb)   SpO2 100%   BMI 24.13 kg/m²     Foot/Ankle Exam    GENERAL  Orientation:  AAOx3  Affect:  appropriate    VASCULAR     Right Foot Vascularity   Normal vascular exam    Dorsalis pedis:  2+  Posterior tibial:  2+  Skin temperature:  warm  Edema grading:  None  CFT:  < 3 seconds  Pedal hair growth:  Present  Varicosities:  none     NEUROLOGIC     Right Foot Neurologic   Light touch sensation: normal  Hot/Cold sensation: normal  Achilles reflex:  2+    MUSCULOSKELETAL     Right Foot Musculoskeletal    Amputation   Toes amputated: second toe and third toe  Tenderness:  MTP 2 dorsal tenderness and MTP 3 dorsal tenderness      MUSCLE STRENGTH     Right Foot Muscle Strength   Normal strength    Foot dorsiflexion:  5  Foot plantar flexion:  5  Foot inversion:  5  Foot eversion:  5    DERMATOLOGIC      Right Foot Dermatologic   Skin  Positive for ulcer. ( The wound has filled in with granular tissue. No signs of infection noted. Wound measures approximately 2 cm in diameter. )     Right foot additional comments: Multiple pretibial hyperpigmented lesions involving the right lower extremity.  Large open wound to the dorsal aspect of the foot secondary to excisional biopsy.  Mild bleeding and subtle malodor.  No active signs of infection    04/12/2023  Recurrent nodular hyperpigmented lesion to the plantar aspect of the 3rd metatarsal head region which appears consistent with recurrent melanoma. Smaller hyperpigmented lesions involving the pretibial region of the right lower extremity.       Left foot additional comments: 04/12/2023  Discomfort with palpation to the plantar medial calcaneal tuberosity on the left heel. No gross deformity and or instability involving the left lower extremity.      Assessment & Plan   Diagnoses and all orders for this visit:    1. Chronic heel pain, left (Primary)  -     XR Foot 3+ View Left    2. Plantar fasciitis, left    3. Malignant melanoma of right " "foot    4. Malignant melanoma of right lower extremity    5. Right toe amputee      Patient is a 62-year-old female that returns with new issues involving her left heel.  Patient has noticed sudden onset of discomfort and over the last several weeks.  Clinically, the symptoms appear to be consistent with Planter fasciitis.  I did review the diagnosis and treatment options.  I have suggested that she acquire a pair of over-the-counter arch supports and we did discuss stretching exercises.  I do feel that symptoms will improve over time.  We did review the possibility of a steroid injection but she does not want to consider at this time.  On her right foot, she has noticed new lesion to the plantar aspect of her right foot very close to previous resection site of her melanoma.  She also continues to have hyperpigmented lesions which are of concern to the pretibial region of the right lower extremity.  I did discuss this with her at length and I am concerned that these lesions remain consistent with melanomas.  Patient states that she has been seeing an oncologist regarding her thyroid and that she was told that she is in \"remission\".  Patient appears to be talking about her thyroid and I am unsure if she ever formally received treatment regarding her right lower extremity melanomas.  We discussed these issues extensively and she states that she will be contacting her oncologist to discuss.  I do feel that it is very important that she address these issues with the surgical oncologist as well as a medical oncologist.  I have attempted to contact patient's oncologist, Dr. Bruce Morales, informally discuss.  I am awaiting his return call.  I have asked patient to call me with any additional issues or concerns.  She has opted to see me on an as-needed basis regarding these issues.  She states that she understands the importance of appropriate follow-up regarding the right lower extremity.  Greater than 30 minutes spent " before, during, and after evaluation for patient care.    Orders Placed This Encounter   Procedures   • XR Foot 3+ View Left     Order Specific Question:   Reason for Exam:     Answer:   chronic heel plantar pain, rm 15, wb     Order Specific Question:   Does this patient have a diabetic monitoring/medication delivering device on?     Answer:   No     Order Specific Question:   Release to patient     Answer:   Routine Release          Note is dictated utilizing voice recognition software. Unfortunately this leads to occasional typographical errors. I apologize in advance if the situation occurs. If questions occur please do not hesitate to call our office.    Transcribed from ambient dictation for CHARLOTTE Luna DPM by Emy Hodge.  04/12/23   11:43 EDT    Patient or patient representative verbalized consent to the visit recording.  I have personally performed the services described in this document as transcribed by the above individual, and it is both accurate and complete.

## 2023-07-24 ENCOUNTER — HOSPITAL ENCOUNTER (EMERGENCY)
Facility: HOSPITAL | Age: 63
Discharge: HOME | End: 2023-07-24
Admitting: EMERGENCY MEDICINE
Payer: MEDICAID

## 2023-07-24 VITALS
BODY MASS INDEX: 18.29 KG/M2 | HEART RATE: 101 BPM | HEIGHT: 65 IN | RESPIRATION RATE: 20 BRPM | WEIGHT: 109.79 LBS | OXYGEN SATURATION: 100 % | DIASTOLIC BLOOD PRESSURE: 48 MMHG | SYSTOLIC BLOOD PRESSURE: 113 MMHG | TEMPERATURE: 98.1 F

## 2023-07-24 DIAGNOSIS — M79.671 RIGHT FOOT PAIN: Primary | ICD-10-CM

## 2023-07-24 PROCEDURE — 99282 EMERGENCY DEPT VISIT SF MDM: CPT

## 2023-07-24 RX ORDER — AMOXICILLIN AND CLAVULANATE POTASSIUM 875; 125 MG/1; MG/1
1 TABLET, FILM COATED ORAL 2 TIMES DAILY
Qty: 14 TABLET | Refills: 0 | Status: SHIPPED | OUTPATIENT
Start: 2023-07-24 | End: 2023-07-31

## 2023-07-24 NOTE — ED PROVIDER NOTES
Subjective   History of Present Illness  Patient is a 63-year-old white female who presents today with complaints of some pain in her right foot.  She states she has been undergoing radiation treatments for melanoma in the right foot.  States she has completed her radiation 5 days ago and now her foot is starting to hurt.  She is concerned there could be some infection there.  She denies any fever chills nausea or vomiting.    Review of Systems   Constitutional:  Negative for chills and fever.   Gastrointestinal:  Negative for nausea and vomiting.   Musculoskeletal:         Right foot pain     Past Medical History:   Diagnosis Date    Anesthesia complication     nausea approx 1 month post op    Anxiety     mild    Cancer     melanoma    Insomnia     PONV (postoperative nausea and vomiting)        No Known Allergies    Past Surgical History:   Procedure Laterality Date    BONE BIOPSY LEG Right 5/13/2022    Procedure: BIOPSY SOFT TISSUE LOWER EXTEMITY;  Surgeon: CHARLOTTE Luna DPM;  Location: [facility];  Service: Podiatry;  Laterality: Right;    FOOT SURGERY      melanoma surgery   x2    TOE AMPUTATION Right     melanoma       Family History   Problem Relation Age of Onset    Cancer Mother     Heart disease Father     No Known Problems Maternal Grandmother     No Known Problems Maternal Grandfather     No Known Problems Paternal Grandmother     No Known Problems Paternal Grandfather        Social History     Socioeconomic History    Marital status:    Tobacco Use    Smoking status: Former    Smokeless tobacco: Never   Vaping Use    Vaping Use: Never used   Substance and Sexual Activity    Alcohol use: Never    Drug use: Never    Sexual activity: Defer           Objective   Physical Exam  Vital signs and triage nurse note reviewed.  Constitutional: Awake, alert; well-developed and well-nourished. No acute distress is noted.  Cardiovascular: Regular rate and rhythm  Pulmonary: Respiratory effort regular  nonlabored  Musculoskeletal: Independent range of motion of all extremities  Neuro: Alert oriented x3, speech is clear and appropriate, GCS 15.    Skin: Flesh tone, warm, dry.  Tenderness to the plantar surface of the right foot near the MCP joints.  There were changes of radiation treatments noted to this area with some open wounds and some scant drainage.  There is no surrounding erythema.  No streaking.    Procedures           ED Course                                           Medical Decision Making  Patient presents today with the above complaint.    Patient had above exam and evaluation.    Diagnosis and treatment plan discussed with patient.  Patient agreeable to plan.   I discussed findings with patient who voices understanding of discharge instructions, signs and symptoms requiring return to ED; discharged improved and in stable condition with follow up for re-evaluation.  Prescription for Augmentin.    Problems Addressed:  Right foot pain: complicated acute illness or injury    Risk  Prescription drug management.        Final diagnoses:   Right foot pain       ED Disposition  ED Disposition       ED Disposition   Discharge    Condition   Stable    Comment   --               Vielka Wyatt MD  [address] [facility] [place] [address] [phone]               Medication List        New Prescriptions      amoxicillin-clavulanate 875-125 MG per tablet  Commonly known as: AUGMENTIN  Take 1 tablet by mouth 2 (Two) Times a Day for 7 days.               Where to Get Your Medications        These medications were sent to [facility] - [place], [place] - [address] - [phone]  - [phone]   [address] [address]      Phone: [phone]   amoxicillin-clavulanate 875-125 MG per tablet            Kacy Pritchard APRN  07/24/23 9934

## 2023-07-24 NOTE — DISCHARGE INSTRUCTIONS
Take antibiotics as prescribed.  Keep the area clean dry and covered.  Follow-up with your oncologist and/or primary care provider.  Return for new or worsening symptoms.

## 2023-08-03 ENCOUNTER — HOSPITAL ENCOUNTER (EMERGENCY)
Facility: HOSPITAL | Age: 63
Discharge: HOME | End: 2023-08-03
Attending: EMERGENCY MEDICINE
Payer: MEDICAID

## 2023-08-03 VITALS
WEIGHT: 109.57 LBS | BODY MASS INDEX: 18.26 KG/M2 | HEIGHT: 65 IN | HEART RATE: 75 BPM | RESPIRATION RATE: 17 BRPM | SYSTOLIC BLOOD PRESSURE: 101 MMHG | DIASTOLIC BLOOD PRESSURE: 64 MMHG | OXYGEN SATURATION: 97 % | TEMPERATURE: 98 F

## 2023-08-03 DIAGNOSIS — R11.2 NAUSEA AND VOMITING, UNSPECIFIED VOMITING TYPE: Primary | ICD-10-CM

## 2023-08-03 DIAGNOSIS — K52.9 GASTROENTERITIS: ICD-10-CM

## 2023-08-03 LAB
ANION GAP SERPL CALCULATED.3IONS-SCNC: 8 MMOL/L (ref 5–15)
BASOPHILS # BLD AUTO: 0.1 10*3/MM3 (ref 0–0.2)
BASOPHILS NFR BLD AUTO: 1.3 % (ref 0–1.5)
BUN SERPL-MCNC: 10 MG/DL (ref 8–23)
BUN/CREAT SERPL: 9.9 (ref 7–25)
CALCIUM SPEC-SCNC: 9.5 MG/DL (ref 8.6–10.5)
CHLORIDE SERPL-SCNC: 99 MMOL/L (ref 98–107)
CO2 SERPL-SCNC: 27 MMOL/L (ref 22–29)
CREAT SERPL-MCNC: 1.01 MG/DL (ref 0.57–1)
DEPRECATED RDW RBC AUTO: 39.4 FL (ref 37–54)
EGFRCR SERPLBLD CKD-EPI 2021: 62.7 ML/MIN/1.73
EOSINOPHIL # BLD AUTO: 0.3 10*3/MM3 (ref 0–0.4)
EOSINOPHIL NFR BLD AUTO: 5.2 % (ref 0.3–6.2)
ERYTHROCYTE [DISTWIDTH] IN BLOOD BY AUTOMATED COUNT: 13.6 % (ref 12.3–15.4)
GLUCOSE SERPL-MCNC: 110 MG/DL (ref 65–99)
HCT VFR BLD AUTO: 29.6 % (ref 34–46.6)
HGB BLD-MCNC: 10 G/DL (ref 12–15.9)
LYMPHOCYTES # BLD AUTO: 2.3 10*3/MM3 (ref 0.7–3.1)
LYMPHOCYTES NFR BLD AUTO: 48.6 % (ref 19.6–45.3)
MCH RBC QN AUTO: 28 PG (ref 26.6–33)
MCHC RBC AUTO-ENTMCNC: 33.9 G/DL (ref 31.5–35.7)
MCV RBC AUTO: 82.5 FL (ref 79–97)
MONOCYTES # BLD AUTO: 0.4 10*3/MM3 (ref 0.1–0.9)
MONOCYTES NFR BLD AUTO: 9.2 % (ref 5–12)
NEUTROPHILS NFR BLD AUTO: 1.7 10*3/MM3 (ref 1.7–7)
NEUTROPHILS NFR BLD AUTO: 35.7 % (ref 42.7–76)
NRBC BLD AUTO-RTO: 0 /100 WBC (ref 0–0.2)
PLATELET # BLD AUTO: 211 10*3/MM3 (ref 140–450)
PMV BLD AUTO: 7.9 FL (ref 6–12)
POTASSIUM SERPL-SCNC: 4.5 MMOL/L (ref 3.5–5.2)
RBC # BLD AUTO: 3.59 10*6/MM3 (ref 3.77–5.28)
SODIUM SERPL-SCNC: 134 MMOL/L (ref 136–145)
WBC NRBC COR # BLD: 4.8 10*3/MM3 (ref 3.4–10.8)

## 2023-08-03 PROCEDURE — 96374 THER/PROPH/DIAG INJ IV PUSH: CPT

## 2023-08-03 PROCEDURE — 85025 COMPLETE CBC W/AUTO DIFF WBC: CPT | Performed by: EMERGENCY MEDICINE

## 2023-08-03 PROCEDURE — 25010000002 ONDANSETRON PER 1 MG: Performed by: EMERGENCY MEDICINE

## 2023-08-03 PROCEDURE — 99283 EMERGENCY DEPT VISIT LOW MDM: CPT

## 2023-08-03 PROCEDURE — 80048 BASIC METABOLIC PNL TOTAL CA: CPT | Performed by: EMERGENCY MEDICINE

## 2023-08-03 RX ORDER — ONDANSETRON 2 MG/ML
4 INJECTION INTRAMUSCULAR; INTRAVENOUS ONCE
Status: COMPLETED | OUTPATIENT
Start: 2023-08-03 | End: 2023-08-03

## 2023-08-03 RX ORDER — SODIUM CHLORIDE 0.9 % (FLUSH) 0.9 %
10 SYRINGE (ML) INJECTION AS NEEDED
Status: DISCONTINUED | OUTPATIENT
Start: 2023-08-03 | End: 2023-08-03 | Stop reason: HOSPADM

## 2023-08-03 RX ADMIN — SODIUM CHLORIDE 500 ML: 9 INJECTION, SOLUTION INTRAVENOUS at 14:18

## 2023-08-03 RX ADMIN — ONDANSETRON 4 MG: 2 INJECTION INTRAMUSCULAR; INTRAVENOUS at 14:18

## 2023-08-03 NOTE — ED PROVIDER NOTES
Subjective   History of Present Illness  Patient is a 63-year-old female complaining of vomiting for the past 2 days.  Denies fever chest pain diarrhea dysuria or other complaint.    Review of Systems    Past Medical History:   Diagnosis Date    Anesthesia complication     nausea approx 1 month post op    Anxiety     mild    Cancer     melanoma    Insomnia     PONV (postoperative nausea and vomiting)        No Known Allergies    Past Surgical History:   Procedure Laterality Date    BONE BIOPSY LEG Right 5/13/2022    Procedure: BIOPSY SOFT TISSUE LOWER EXTEMITY;  Surgeon: CHARLOTTE Luna DPM;  Location: [facility] MAIN OR;  Service: Podiatry;  Laterality: Right;    FOOT SURGERY      melanoma surgery   x2    TOE AMPUTATION Right     melanoma       Family History   Problem Relation Age of Onset    Cancer Mother     Heart disease Father     No Known Problems Maternal Grandmother     No Known Problems Maternal Grandfather     No Known Problems Paternal Grandmother     No Known Problems Paternal Grandfather        Social History     Socioeconomic History    Marital status:    Tobacco Use    Smoking status: Former    Smokeless tobacco: Never   Vaping Use    Vaping Use: Never used   Substance and Sexual Activity    Alcohol use: Never    Drug use: Never    Sexual activity: Defer           Objective   Physical Exam  Neck has no adenopathy JVD or bruits.  Lungs are clear.  Heart has regular rhythm without murmur.  Chest is nontender.  Abdomen soft nontender.  Back has no tenderness.  Extremity exam unremarkable.  Procedures           ED Course      Results for orders placed or performed during the hospital encounter of 08/03/23   Basic Metabolic Panel    Specimen: Blood   Result Value Ref Range    Glucose 110 (H) 65 - 99 mg/dL    BUN 10 8 - 23 mg/dL    Creatinine 1.01 (H) 0.57 - 1.00 mg/dL    Sodium 134 (L) 136 - 145 mmol/L    Potassium 4.5 3.5 - 5.2 mmol/L    Chloride 99 98 - 107 mmol/L    CO2 27.0 22.0 - 29.0 mmol/L     Calcium 9.5 8.6 - 10.5 mg/dL    BUN/Creatinine Ratio 9.9 7.0 - 25.0    Anion Gap 8.0 5.0 - 15.0 mmol/L    eGFR 62.7 >60.0 mL/min/1.73   CBC Auto Differential    Specimen: Blood   Result Value Ref Range    WBC 4.80 3.40 - 10.80 10*3/mm3    RBC 3.59 (L) 3.77 - 5.28 10*6/mm3    Hemoglobin 10.0 (L) 12.0 - 15.9 g/dL    Hematocrit 29.6 (L) 34.0 - 46.6 %    MCV 82.5 79.0 - 97.0 fL    MCH 28.0 26.6 - 33.0 pg    MCHC 33.9 31.5 - 35.7 g/dL    RDW 13.6 12.3 - 15.4 %    RDW-SD 39.4 37.0 - 54.0 fl    MPV 7.9 6.0 - 12.0 fL    Platelets 211 140 - 450 10*3/mm3    Neutrophil % 35.7 (L) 42.7 - 76.0 %    Lymphocyte % 48.6 (H) 19.6 - 45.3 %    Monocyte % 9.2 5.0 - 12.0 %    Eosinophil % 5.2 0.3 - 6.2 %    Basophil % 1.3 0.0 - 1.5 %    Neutrophils, Absolute 1.70 1.70 - 7.00 10*3/mm3    Lymphocytes, Absolute 2.30 0.70 - 3.10 10*3/mm3    Monocytes, Absolute 0.40 0.10 - 0.90 10*3/mm3    Eosinophils, Absolute 0.30 0.00 - 0.40 10*3/mm3    Basophils, Absolute 0.10 0.00 - 0.20 10*3/mm3    nRBC 0.0 0.0 - 0.2 /100 WBC                                          Medical Decision Making  Metabolic panel is at baseline without renal insufficiency or electrolyte abnormality.  Patient has no leukocytosis.  Patient most likely does have gastroenteritis.  She was given IV fluids as well as IV Zofran.  She had no vomiting in the ED and feels improved.  She will be discharged to follow with her MD for recheck as needed.    Amount and/or Complexity of Data Reviewed  Labs: ordered. Decision-making details documented in ED Course.    Risk  Prescription drug management.        Final diagnoses:   Nausea and vomiting, unspecified vomiting type   Gastroenteritis       ED Disposition  ED Disposition       ED Disposition   Discharge    Condition   Stable    Comment   --               No follow-up provider specified.       Medication List      No changes were made to your prescriptions during this visit.            Jean Navarrete MD  08/03/23 1290

## 2023-09-12 ENCOUNTER — TRANSCRIBE ORDERS (OUTPATIENT)
Dept: ADMINISTRATIVE | Facility: HOSPITAL | Age: 63
End: 2023-09-12
Payer: MEDICARE

## 2023-09-12 DIAGNOSIS — Z78.0 POSTMENOPAUSAL: Primary | ICD-10-CM

## 2023-09-22 ENCOUNTER — HOSPITAL ENCOUNTER (OUTPATIENT)
Dept: BONE DENSITY | Facility: HOSPITAL | Age: 63
Discharge: HOME | End: 2023-09-22
Admitting: FAMILY MEDICINE
Payer: MEDICARE

## 2023-09-22 DIAGNOSIS — Z78.0 POSTMENOPAUSAL: ICD-10-CM

## 2023-09-22 PROCEDURE — 77080 DXA BONE DENSITY AXIAL: CPT

## 2023-11-15 ENCOUNTER — TRANSCRIBE ORDERS (OUTPATIENT)
Dept: ADMINISTRATIVE | Facility: HOSPITAL | Age: 63
End: 2023-11-15
Payer: MEDICARE

## 2023-11-15 DIAGNOSIS — Z12.31 VISIT FOR SCREENING MAMMOGRAM: Primary | ICD-10-CM

## (undated) DEVICE — MICRO SAGITTAL BLADE (9.5 X 0.4 X 25.5MM)

## (undated) DEVICE — GLV SURG BIOGEL M LTX PF 7 1/2

## (undated) DEVICE — PENCL HND ROCKRSWTCH HOLSTR EZ CLEAN TP CRD 10FT

## (undated) DEVICE — SUT VIC 2/0 CT2 27IN J269H

## (undated) DEVICE — BNDG ELAS ELITE V/CLOSE 4IN 5YD LF STRL

## (undated) DEVICE — KT SURG TURNOVER 050

## (undated) DEVICE — SOL IRRIG NACL 1000ML

## (undated) DEVICE — SOLUTION,WATER,IRRIGATION,1000ML,STERILE: Brand: MEDLINE

## (undated) DEVICE — BNDG ESMARK 4IN 12FT LF STRL BLU

## (undated) DEVICE — PK EXTREM 50

## (undated) DEVICE — GOWN,REINFORCE,POLY,SIRUS,BREATH SLV,XLG: Brand: MEDLINE

## (undated) DEVICE — CUFF TOURNI 1BLADDER 1PRT 30IN STRL

## (undated) DEVICE — UNDERGLV SURG BIOGEL INDICAT PI SZ8 BLU

## (undated) DEVICE — SPNG GZ WOVN 4X4IN 12PLY 10/BX STRL

## (undated) DEVICE — SUT ETHLN 3/0 FS1 30IN 669H

## (undated) DEVICE — INTENDED FOR TISSUE SEPARATION, AND OTHER PROCEDURES THAT REQUIRE A SHARP SURGICAL BLADE TO PUNCTURE OR CUT.: Brand: BARD-PARKER ® CARBON RIB-BACK BLADES